# Patient Record
Sex: MALE | Race: WHITE | NOT HISPANIC OR LATINO | Employment: FULL TIME
[De-identification: names, ages, dates, MRNs, and addresses within clinical notes are randomized per-mention and may not be internally consistent; named-entity substitution may affect disease eponyms.]

---

## 2017-03-28 ENCOUNTER — RECORDS - HEALTHEAST (OUTPATIENT)
Dept: ADMINISTRATIVE | Facility: OTHER | Age: 52
End: 2017-03-28

## 2017-04-11 ENCOUNTER — OFFICE VISIT - HEALTHEAST (OUTPATIENT)
Dept: INTERNAL MEDICINE | Facility: CLINIC | Age: 52
End: 2017-04-11

## 2017-04-11 DIAGNOSIS — J11.1 INFLUENZA-LIKE SYNDROME: ICD-10-CM

## 2017-04-11 RX ORDER — HYDROCORTISONE VALERATE CREAM 2 MG/G
CREAM TOPICAL
Qty: 45 G | Refills: 1 | Status: SHIPPED | OUTPATIENT
Start: 2017-04-11

## 2017-05-23 ENCOUNTER — RECORDS - HEALTHEAST (OUTPATIENT)
Dept: ADMINISTRATIVE | Facility: OTHER | Age: 52
End: 2017-05-23

## 2017-06-27 ENCOUNTER — RECORDS - HEALTHEAST (OUTPATIENT)
Dept: ADMINISTRATIVE | Facility: OTHER | Age: 52
End: 2017-06-27

## 2017-07-12 ENCOUNTER — RECORDS - HEALTHEAST (OUTPATIENT)
Dept: ADMINISTRATIVE | Facility: OTHER | Age: 52
End: 2017-07-12

## 2017-07-12 ENCOUNTER — RECORDS - HEALTHEAST (OUTPATIENT)
Dept: BONE DENSITY | Facility: CLINIC | Age: 52
End: 2017-07-12

## 2017-07-12 DIAGNOSIS — K50.119 CROHN'S DISEASE OF LARGE INTESTINE WITH UNSPECIFIED COMPLICATIONS (H): ICD-10-CM

## 2017-07-17 ENCOUNTER — RECORDS - HEALTHEAST (OUTPATIENT)
Dept: ADMINISTRATIVE | Facility: OTHER | Age: 52
End: 2017-07-17

## 2017-07-19 ENCOUNTER — RECORDS - HEALTHEAST (OUTPATIENT)
Dept: ADMINISTRATIVE | Facility: OTHER | Age: 52
End: 2017-07-19

## 2017-07-23 ENCOUNTER — COMMUNICATION - HEALTHEAST (OUTPATIENT)
Dept: INTERNAL MEDICINE | Facility: CLINIC | Age: 52
End: 2017-07-23

## 2017-09-12 ENCOUNTER — RECORDS - HEALTHEAST (OUTPATIENT)
Dept: ADMINISTRATIVE | Facility: OTHER | Age: 52
End: 2017-09-12

## 2017-11-07 ENCOUNTER — RECORDS - HEALTHEAST (OUTPATIENT)
Dept: ADMINISTRATIVE | Facility: OTHER | Age: 52
End: 2017-11-07

## 2017-11-13 ENCOUNTER — RECORDS - HEALTHEAST (OUTPATIENT)
Dept: ADMINISTRATIVE | Facility: OTHER | Age: 52
End: 2017-11-13

## 2017-12-12 ENCOUNTER — RECORDS - HEALTHEAST (OUTPATIENT)
Dept: ADMINISTRATIVE | Facility: OTHER | Age: 52
End: 2017-12-12

## 2018-01-02 ENCOUNTER — RECORDS - HEALTHEAST (OUTPATIENT)
Dept: ADMINISTRATIVE | Facility: OTHER | Age: 53
End: 2018-01-02

## 2018-02-27 ENCOUNTER — RECORDS - HEALTHEAST (OUTPATIENT)
Dept: ADMINISTRATIVE | Facility: OTHER | Age: 53
End: 2018-02-27

## 2018-06-19 ENCOUNTER — RECORDS - HEALTHEAST (OUTPATIENT)
Dept: ADMINISTRATIVE | Facility: OTHER | Age: 53
End: 2018-06-19

## 2018-06-20 ENCOUNTER — RECORDS - HEALTHEAST (OUTPATIENT)
Dept: ADMINISTRATIVE | Facility: OTHER | Age: 53
End: 2018-06-20

## 2018-08-14 ENCOUNTER — RECORDS - HEALTHEAST (OUTPATIENT)
Dept: ADMINISTRATIVE | Facility: OTHER | Age: 53
End: 2018-08-14

## 2018-08-15 ENCOUNTER — RECORDS - HEALTHEAST (OUTPATIENT)
Dept: ADMINISTRATIVE | Facility: OTHER | Age: 53
End: 2018-08-15

## 2018-08-19 ENCOUNTER — COMMUNICATION - HEALTHEAST (OUTPATIENT)
Dept: INTERNAL MEDICINE | Facility: CLINIC | Age: 53
End: 2018-08-19

## 2018-08-21 ENCOUNTER — RECORDS - HEALTHEAST (OUTPATIENT)
Dept: ADMINISTRATIVE | Facility: OTHER | Age: 53
End: 2018-08-21

## 2018-08-22 ENCOUNTER — RECORDS - HEALTHEAST (OUTPATIENT)
Dept: ADMINISTRATIVE | Facility: OTHER | Age: 53
End: 2018-08-22

## 2018-10-09 ENCOUNTER — RECORDS - HEALTHEAST (OUTPATIENT)
Dept: ADMINISTRATIVE | Facility: OTHER | Age: 53
End: 2018-10-09

## 2018-11-21 ENCOUNTER — HOSPITAL ENCOUNTER (OUTPATIENT)
Dept: BEHAVIORAL HEALTH | Facility: CLINIC | Age: 53
Discharge: HOME | End: 2018-11-21
Attending: SOCIAL WORKER | Admitting: SOCIAL WORKER
Payer: COMMERCIAL

## 2018-11-21 PROCEDURE — H0001 ALCOHOL AND/OR DRUG ASSESS: HCPCS

## 2018-11-21 ASSESSMENT — PAIN SCALES - GENERAL: PAINLEVEL: NO PAIN (0)

## 2018-11-21 ASSESSMENT — ANXIETY QUESTIONNAIRES
6. BECOMING EASILY ANNOYED OR IRRITABLE: NOT AT ALL
2. NOT BEING ABLE TO STOP OR CONTROL WORRYING: SEVERAL DAYS
1. FEELING NERVOUS, ANXIOUS, OR ON EDGE: NOT AT ALL
GAD7 TOTAL SCORE: 1
3. WORRYING TOO MUCH ABOUT DIFFERENT THINGS: NOT AT ALL
5. BEING SO RESTLESS THAT IT IS HARD TO SIT STILL: NOT AT ALL
7. FEELING AFRAID AS IF SOMETHING AWFUL MIGHT HAPPEN: NOT AT ALL
4. TROUBLE RELAXING: NOT AT ALL

## 2018-11-21 ASSESSMENT — PATIENT HEALTH QUESTIONNAIRE - PHQ9: SUM OF ALL RESPONSES TO PHQ QUESTIONS 1-9: 5
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
## 2018-11-21 NOTE — PROGRESS NOTES
"Rule 25 Assessment  Background Information   1. Date of Assessment Request  2. Date of Assessment  11/21/2018 3. Date Service Authorized     4.   Evelia Lundy, SAMIRA, CONSTANZA, TIAGO     5.  Phone Number   [phone]   6. Referent  Family/Self 7. Assessment Site  [facility]     8. Client Name   Joe Pierce  9. Date of Birth  1965 Age  53 year old 10. Gender  male  11. PMI/ Insurance No.  [id number]   12. Client's Primary Language:  English 13. Do you require special accommodations, such as an  or assistance with written material? No   14. Current Address: [address] [place] [address]   15. Client Phone Numbers: [phone] (home)      16. Tell me what has happened to bring you here today.    On 11/21/2018, Mr. Diaz presented to the office of [facility] for a Rule 25 evaluation of substance use. He reported his family is concerned about his drinking and that he drinks too much. On 11/14/2018, they intervened with his mother being the ringleader and his mother got a friend and his children involved.     17. Have you had other rule 25 assessments?     No    DIMENSION I - Acute Intoxication /Withdrawal Potential   1. Chemical use most recent 12 months outside a facility and other significant use history (client self-report)              X = Primary Drug Used   Age of First Use Most Recent Pattern of Use and Duration   Need enough information to show pattern (both frequency and amounts) and to show tolerance for each chemical that has a diagnosis   Date of last use and time, if needed   Withdrawal Potential? Requiring special care Method of use  (oral, smoked, snort, IV, etc)     X Alcohol 16 Highest Use - 20s - 4 to 5 days per week on weekends. \"I didn't miss too many parties.\" He drank about 12 to 15 beers.     About 5 to 7 years ago - his marriage wasn't going great. Alcohol was a factor, but not the only reason. He drank 3 " days per week, consuming about 6 beers per time.     1.5 years ago - he . When his kids are around, he drinks 0 to 2 beers. When they aren't around, he drinks most days and has 6 to 10 craft or IPA beers. He will drink at the bar or at home. He's been involved in the [employer] and his coworkers enjoy trying places. He hasn't missed a beat in how the craft beers change.     11/17/18, 8 beers No Oral      Marijuana/  Hashish 17 Never been high.  High school No Smoke      Cocaine/Crack N/A           Meth/  Amphetamines N/A           Heroin   N/A           Other Opiates/  Synthetics N/A          Inhalants N/A           Benzodiazepines N/A           Hallucinogens N/A           Barbiturates/  Sedatives/  Hypnotics 40s For a period, the took OTC sleep meds, but stopped because he didn't like hangovers from it.   40s No Oral      Over-the-Counter Drugs N/A           Other N/A           Nicotine 16 Patient chews 2 tins per week.   11/21/18 No Oral     2. Do you use greater amounts of alcohol/other drugs to feel intoxicated or achieve the desired effect?  Yes.  Or use the same amount and get less of an effect?  Yes.  Example:     3A. Have you ever been to detox?  No    3B. When was the first time? NA    3C. How many times since then? NA    3D. Date of most recent detox: NA    4.  Withdrawal symptoms: Have you had any of the following withdrawal symptoms?  Past 12 months Recent (past 30 days)   Shaky / Jittery / Tremors  Headache  Irritability  Diarrhea Shaky / Jittery / Tremors  Headache  Irritability  Diarrhea     's Visual Observations and Symptoms: No visible withdrawal symptoms at this time    Based on the above information, is withdrawal likely to require attention as part of treatment participation?  No    Dimension I Ratings   Acute intoxication/Withdrawal potential - The placing authority must use the criteria in Dimension I to determine a client s acute intoxication and withdrawal potential.    RISK  DESCRIPTIONS - Severity ratin Client displays full functioning with good ability to tolerate and cope with withdrawal discomfort. No signs or symptoms of intoxication or withdrawal or resolving signs or symptoms.    REASONS SEVERITY WAS ASSIGNED (What about the amount of the person s use and date of most recent use and history of withdrawal problems suggests the potential of withdrawal symptoms requiring professional assistance? )     Patient does not appear at risk of having significant withdrawal symptoms at this time. He denied current feelings of withdrawal. He reports that his last use of alcohol was on 2018. Patient was administered a breathalyzer during the evaluation and the SUSAN was 0.00. Patient was also administered an urinalysis during the evaluation and the UA was negative for all substances. At the time of the Substance Use Evaluation his blood pressure was 124/96 and pulse was 87 BPM.      DIMENSION II - Biomedical Complications and Conditions   1. Do you have any current health/medical conditions?(Include any infectious diseases, allergies, or chronic or acute pain, history of chronic conditions)       Patient was diagnosed with Crohn's about 24 years ago. He had cataract surgery in the past. He denied allergies. He stated alcohol does not affect Crohn's and the Remicade has made Crohn's much better. Prednisone caused the cataracts.     2. Do you have a health care provider? When was your most recent appointment? What concerns were identified?     He has a primary care doctor and is due to go in for a check up.      3. If indicated by answers to items 1 or 2: How do you deal with these concerns? Is that working for you? If you are not receiving care for this problem, why not?      Remicade has been stabilizing the Crohn's and it's infused every 8 weeks.     4A. List current medication(s) including over-the-counter or herbal supplements--including pain management:     Remicade and Paroxetine.      4B. Do you follow current medical recommendations/take medications as prescribed?     Yes - he misses a dose every few weeks. He takes vitamins too.     4C. When did you last take your medication?     Today    4D. Do you need a referral to have a follow up with a primary care physician?    No.    5. Has a health care provider/healer ever recommended that you reduce or quit alcohol/drug use?     They ask questions and they recommended cutting back, but he didn't tell them the whole truth.     6. Are you pregnant?     NA - Male    7. Have you had any injuries, assaults/violence towards you, accidents, health related issues, overdose(s) or hospitalizations related to your use of alcohol or other drugs:     Yes, explain: has blackouts about once a month.     8. Do you have any specific physical needs/accommodations? No    Dimension II Ratings   Biomedical Conditions and Complications - The placing authority must use the criteria in Dimension II to determine a client s biomedical conditions and complications.   RISK DESCRIPTIONS - Severity ratin Client tolerates and travis with physical discomfort and is able to get the services that the client needs.    REASONS SEVERITY WAS ASSIGNED (What physical/medical problems does this person have that would inhibit his or her ability to participate in treatment? What issues does he or she have that require assistance to address?)    Patient was diagnosed with Crohn's about 24 years ago. He had cataract surgery in the past. He denied allergies. He is prescribed Remicade and Paroxetine. Patient denied having any chronic biomedical conditions that would interfere with treatment. He denied having pain. Patient has a primary care clinic and is able to seek services as needed and he would benefit from following all of the recommendations of medical providers.      DIMENSION III - Emotional, Behavioral, Cognitive Conditions and Complications   1. (Optional) Tell me what it was  "like growing up in your family. (substance use, mental health, discipline, abuse, support)     Patient grew up in [place]. His parents remained  until his father  in . Patient has an older brother and a younger brother. Patient denied emotional, physical, and sexual abuse. He denied mental health and substance use concerns in his family.     2. When was the last time that you had significant problems...  A. with feeling very trapped, lonely, sad, blue, depressed or hopeless about the future? 2 - 12 months ago - he has taken Paroxetine for the past 10 years. It doesn't eliminate the sad feelings, but might help his depression.     B. with sleep trouble, such as bad dreams, sleeping restlessly, or falling asleep during the day? Past Month - waking up frequently some nights due sleep apnea.     C. with feeling very anxious, nervous, tense, scared, panicked, or like something bad was going to happen? 2 - 12 months ago    D. with becoming very distressed and upset when something reminded you of the past? 1+ years ago    E. with thinking about ending your life or committing suicide? Never     3. When was the last time that you did the following things two or more times?  A. Lied or conned to get things you wanted or to avoid having to do something? 2 - 12 months ago    B. Had a hard time paying attention at school, work, or home? Past Month - for the past 5 years, he feels he has a blockage in his memory and some tasks seem daunting. He stated haven't been any medical conclusions as to the reason.     C. Had a hard time listening to instructions at school, work, or home? Past Month    D. Were a bully or threatened other people? 1+ years ago - in high school and he regrets it.     E. Started physical fights with other people? 1+ years ago - in college after drinking. Currently, when he drinks, he is \"mute and not aggressive.\"     Note: These questions are from the Global Appraisal of Individual " Needs--Short Screener. Any item marked  past month  or  2 to 12 months ago  will be scored with a severity rating of at least 2.     For each item that has occurred in the past month or past year ask follow up questions to determine how often the person has felt this way or has the behavior occurred? How recently? How has it affected their daily living? And, whether they were using or in withdrawal at the time?    See above    4A. If the person has answered item 2E with  in the past year  or  the past month , ask about frequency and history of suicide in the family or someone close and whether they were under the influence.     NA    Any history of suicide in your family? Or someone close to you?     No    4B. If the person answered item 2E  in the past month  ask about  intent, plan, means and access and any other follow-up information  to determine imminent risk. Document any actions taken to intervene  on any identified imminent risk.      NA    5A. Have you ever been diagnosed with a mental health problem?     He stated he has symptoms of depression and anxiety. He hasn't been to a psychiatrist.     5B. Are you receiving care for any mental health issues? If yes, what is the focus of that care or treatment?  Are you satisfied with the service? Most recent appointment?  How has it been helpful?     No    6. Have you been prescribed medications for emotional/psychological problems?     Paroxetine    7. Does your MH provider know about your use?     NA    8A. Have you ever been verbally, emotionally, physically or sexually abused?      No      Follow up questions to learn current risk, continuing emotional impact.      NA    8B. Have you received counseling for abuse?      No    9. Have you ever experienced or been part of a group that experienced community violence, historical trauma, rape or assault?     No    10A. [place]:    No    11. Do you have problems with any of the following things in your daily  life?    No    Note: If the person has any of the above problems, follow up with items 12, 13, and 14. If none of the issues in item 11 are a problem for the person, skip to item 15.    12. Have you been diagnosed with traumatic brain injury or Alzheimer s?  No    13. If the answer to #12 is no, ask the following questions:    Have you ever hit your head or been hit on the head? Yes, he played sports and probably hit his head. He denied change in personality and functioning.    Were you ever seen in the Emergency Room, hospital or by a doctor because of an injury to your head? No    Have you had any significant illness that affected your brain (brain tumor, meningitis, West Nile Virus, stroke or seizure, heart attack, near drowning or near suffocation)? No    14. If the answer to #12 is yes, ask if any of the problems identified in #11 occurred since the head injury or loss of oxygen. No    15A. Highest grade of school completed: College graduate    15B. Do you have a learning disability? No    15C. Did you ever have tutoring in Math or English? No    15D. Have you ever been diagnosed with Fetal Alcohol Effects or Fetal Alcohol Syndrome? No    16. If yes to item 15 B, C, or D: How has this affected your use or been affected by your use? NA    Dimension III Ratings   Emotional/Behavioral/Cognitive - The placing authority must use the criteria in Dimension III to determine a client s emotional, behavioral, and cognitive conditions and complications.   RISK DESCRIPTIONS - Severity ratin Client has impulse control and coping skills. Client presents a mild to moderate risk of harm to self or others or displays symptoms of emotional, behavioral or cognitive problems. Client has a mental health diagnosis and is stable. Client functions adequately in significant life areas.    REASONS SEVERITY WAS ASSIGNED - What current issues might with thinking, feelings or behavior pose barriers to participation in a treatment  program? What coping skills or other assets does the person have to offset those issues? Are these problems that can be initially accommodated by a treatment provider? If not, what specialized skills or attributes must a provider have?    Patient reported having symptoms of depression and anxiety. He is prescribed Paroxetine. Patient s PHQ-9 score was 5 out of 27, indicating mild depression. Patient s JENNI-7 score was 1 out of 21, indicating minimal anxiety. Patient denied suicidal and self-injurious ideation and intent at this time. Patient denied suicide attempts in the past. Patient denied a history of trauma and/or abuse.      DIMENSION IV - Readiness for Change   1. You ve told me what brought you here today. (first section) What do you think the problem really is?     His family intervened and want him to have an evaluation.     2. Tell me how things are going. Ask enough questions to determine whether the person has use related problems or assets that can be built upon in the following areas: Family/friends/relationships; Legal; Financial; Emotional; Educational; Recreational/ leisure; Vocational/employment; Living arrangements (DSM)      Patient and his wife have been  for 1.5 years. They live near each other and share kids the 50/50. In the spring, he found out about her infidelity and sought out counseling four times. He confronted his ex-wife. His drinking has picked up since then.     3. What activities have you engaged in when using alcohol/other drugs that could be hazardous to you or others (i.e. driving a car/motorcycle/boat, operating machinery, unsafe sex, sharing needles for drugs or tattoos, etc     Driving - DUI in 1994.     4. How much time do you spend getting, using or getting over using alcohol or drugs? (DSM)     He primarily drinks in the evenings around 6pm. He starts happy hours with coworkers at 4pm and has 3 beers. He then has more drinks at home. He passes out about once a  "week.    5. Reasons for drinking/drug use (Use the space below to record answers. It may not be necessary to ask each item.)  Like the feeling Yes   Trying to forget problems Yes   To cope with stress Yes   To relieve physical pain No   To cope with anxiety Yes   To cope with depression Yes   To relax or unwind Yes   Makes it easier to talk with people Starts off social and then he \"become the mute kris in the corner.\"    Partner encourages use N/A   Most friends drink or use 90%   To cope with family problems Yes   Afraid of withdrawal symptoms/to feel better No   Other (specify)  N/A     A. What concerns other people about your alcohol or drug use/Has anyone told you that you use too much? What did they say? (DSM)     His mother, brother, and friend Luis Miguel had an intervention with him last week. Inpatient is their vision, but that is not his plan. He stated it's been his mother's concern for a while. He hasn't been progressing towards drinking less. He stated his kids have seen some things too. They wrote him a letter saying they feel they have 2 dads. He has been intoxicated when coming home to his kids. His daughter complained that he wants to take naps alot. He stated that some of that is alcohol related, but the Crohn's contributes to it.    B. What did you think about that/ do you think you have a problem with alcohol or drug use?     Alcohol - he stated that about 50% of time he consumes too much.     6. What changes are you willing to make? What substance are you willing to stop using? How are you going to do that? Have you tried that before? What interfered with your success with that goal?      He is willing to stop drinking and go to outpatient treatment. He doesn't want inpatient treatment.    7. What would be helpful to you in making this change?     Different habits and structure.     Dimension IV Ratings   Readiness for Change - The placing authority must use the criteria in Dimension IV to determine a " "client s readiness for change.   RISK DESCRIPTIONS - Severity rating: 3 Client displays inconsistent compliance, minimal awareness of either the client s addiction or mental disorder, and is minimally cooperative.    REASONS SEVERITY WAS ASSIGNED - (What information did the person provide that supports your assessment of his or her readiness to change? How aware is the person of problems caused by continued use? How willing is she or he to make changes? What does the person feel would be helpful? What has the person been able to do without help?)      Patient identified that alcohol is a problem and he drinks too much about 50% of the time. At the time of the evaluation, patient stated he is willing to do outpatient treatment, but doesn't want inpatient. He appears internally motivated, but by the prompting of his family. Later when discussing treatment options, patient stated he is very busy at work and wants to start IOP in late 01/2019. He identified few coping strategies for reducing or stopping his drinking. He lacks insight in the extent of his drinking and into the process of recovery.     DIMENSION V - Relapse, Continued Use, and Continued Problem Potential   1. In what ways have you tried to control, cut-down or quit your use? If you have had periods of sobriety, how did you accomplish that? What was helpful? What happened to prevent you from continuing your sobriety? (DSM)     About 3 years ago, he didn't drink for a month. He stated he \"just didn't drink.\"     2. Have you experienced cravings? If yes, ask follow up questions to determine if the person recognizes triggers and if the person has had any success in dealing with them.     Cravings - \"Yeah.\"  Triggers - \"It's a habit I've had for a long time.\" Sporting events, neighbors, happy hours are triggers      3. Have you been treated for alcohol/other drug abuse/dependence?     No - after the DUI in 1994, he did the MADD Panel.     4. Support group " participation: Have you/do you attend support group meetings to reduce/stop your alcohol/drug use? How recently? What was your experience? Are you willing to restart? If the person has not participated, is he or she willing?     Not his kind of setting.     5. What would assist you in staying sober/straight?     Making a new habit.     Dimension V Ratings   Relapse/Continued Use/Continued problem potential - The placing authority must use the criteria in Dimension V to determine a client s relapse, continued use, and continued problem potential.   RISK DESCRIPTIONS - Severity rating: 3 Client has poor recognition and understanding of relapse and recidivism issues and displays moderately high vulnerability for further substance use or mental health problems. Client has few coping skills and rarely applies coping skills.    REASONS SEVERITY WAS ASSIGNED - (What information did the person provide that indicates his or her understanding of relapse issues? What about the person s experience indicates how prone he or she is to relapse? What coping skills does the person have that decrease relapse potential?)      Patient denied past treatments and support group attendance. He reported having minimal sober time. He has tried to quit drinking in the past but returned to use. Patient lacks knowledge of the addiction cycle. He lacks insight into his personal relapse process along with warning signs and triggers. He lacks sober coping skills and long-term sober maintenance skills. Patient is at a moderate to high risk for relapse/continued use. He has co-occurring mental health and substance use issues, which places him at higher risk for continued use.      DIMENSION VI - Recovery Environment   1. Are you employed/attending school? Tell me about that.     Patient has been an  at [employer] for 19 years. He had the same boss until a few months ago. He and the old boss were friends. It was a pretty lax environment and  "didn't matter much if he wasn't feeling the best the next morning. He does not drink at work because it is usually obvious when he is drinking. He stated if he has a long leash, then he will take it. But if he has a shorter leash, then he will moderate. He stated he will see how it goes with the new boss. He stated he will have to change how he drinks and he will have to show up. He stated he won't drink on Sundays or during week. And it's important for him to get quality sleep without passing out.     2A. Describe a typical day; evening for you. Work, school, social, leisure, volunteer, spiritual practices. Include time spent obtaining, using, recovering from drugs or alcohol. (DSM)     Wake up 7:30am, get ready, work 9am until 5pm, start drinking around 6pm until he falls asleep. He goes to happy hour once a week. Patient reported that use has prevented him from completing daily tasks at times.      2B. How often do you spend more time than you planned using or use more than you planned? (DSM)     20% of the time.     3. How important is using to your social connections? Do many of your family or friends use?     Most friends drink. \"They wouldn't divorce me\" if he didn't drink around them, but he worries about not fitting in socially. He golfs with people who drink. His friend Luis Miguel was a drinking justin for many years, but no longer drinks.     4A. Are you currently in a significant relationship?     No. Patient  in 1999 and  about 1.5 years ago.     4C. Sexual Orientation:     Heterosexual    5A. Who do you live with?      He lives alone but has his children 50% of the time.     5B. Tell me about their alcohol/drug use and mental health issues.     NA    5C. Are you concerned for your safety there? No    5D. Are you concerned about the safety of anyone else who lives with you? No    6A. Do you have children who live with you?     Daughters ages 17 and 15.      6B. Do you have children who do not " live with you?     NA    7A. Who supports you in making changes in your alcohol or drug use? What are they willing to do to support you? Who is upset or angry about you making changes in your alcohol or drug use? How big a problem is this for you?      Mother, brother, friend.     7B. This table is provided to record information about the person s relationships and available support It is not necessary to ask each item; only to get a comprehensive picture of their support system.  How often can you count on the following people when you need someone?   Partner / Spouse N/A   Parent(s)/Aunt(s)/Uncle(s)/Grandparents Always supportive   Sibling(s)/Cousin(s) Always supportive   Child(hyacinth) Usually supportive   Other relative(s) N/A   Friend(s)/neighbor(s) Always supportive   Child(hyacinth) s father(s)/mother(s) Always supportive   Support group member(s) N/A   Community of raquel members N/A   /counselor/therapist/healer N/A   Other (specify) NA     8A. What is your current living situation?     Patient lives alone in a house.     8B. What is your long term plan for where you will be living?     He has no plans to move.     8C. Tell me about your living environment/neighborhood? Ask enough follow up questions to determine safety, criminal activity, availability of alcohol and drugs, supportive or antagonistic to the person making changes.      Patient reported his neighborhood is safe and it is easy to get alcohol.     9. Criminal justice history: Gather current/recent history and any significant history related to substance use--Arrests? Convictions? Circumstances? Alcohol or drug involvement? Sentences? Still on probation or parole? Expectations of the court? Current court order? Any sex offenses - lifetime? What level? (DSM)    - DUI - 1994  - Public intoxication - age 21   - Patient denied current legal involvement.    10. What obstacles exist to participating in treatment? (Time off work, childcare, funding,  transportation, pending [facility] time, living situation)     Work schedule. He is transitioning in his job with and is busy until end of 2019.     Dimension VI Ratings   Recovery environment - The placing authority must use the criteria in Dimension VI to determine a client s recovery environment.   RISK DESCRIPTIONS - Severity ratin Client is engaged in structured, meaningful activity, but peers, family, significant other, and living environment are unsupportive, or there is criminal justice involvement by the client or among the client s peers, significant others, or in the client s living environment.    REASONS SEVERITY WAS ASSIGNED - (What support does the person have for making changes? What structure/stability does the person have in his or her daily life that will increase the likelihood that changes can be sustained? What problems exist in the person s environment that will jeopardize getting/staying clean and sober?)     Patient lives alone. His current living situation is unsupportive toward recovery, as he often drinks alone. He reported having relationship conflict with his family due to his ongoing substance use. He denied being in a significant relationship. Patient lacks an extensive current sober support network. He denied having any concerns regarding immediate living environment or neighborhood. Patient is employed full-time, but lacks a daily structure and meaningful activities after work. Patient denied legal involvement.      Client Choice/Exceptions   Would you like services specific to language, age, gender, culture, Confucianism preference, race, ethnicity, sexual orientation or disability?  No    What particular treatment choices and options would you like to have? Outpatient treatment.     Do you have a preference for a particular treatment program? [facility].     Criteria for Diagnosis     Criteria for Diagnosis  DSM-5 Criteria for Substance Use Disorder  Instructions: Determine whether  the client currently meets the criteria for Substance Use Disorder using the diagnostic criteria in the DSM-V [id number].[phone]. Current means during the most recent 12 months outside a facility that controls access to substances    Category of Substance Severity (ICD-10 Code / DSM 5 Code)     Alcohol Use Disorder Severe  (10.20) (303.90)   Cannabis Use Disorder NA   Hallucinogen Use Disorder NA   Inhalant Use Disorder NA   Opioid Use Disorder NA   Sedative, Hypnotic, or Anxiolytic Use Disorder NA   Stimulant Related Disorder NA   Tobacco Use Disorder NA   Other (or unknown) Substance Use Disorder NA     Collateral Contact Summary   Number of contacts made: 2  Contact with referring person:  NA.  If court related records were reviewed, summarize here: LILIAN    Rule 25 Assessment Summary and Plan   's Recommendation    It is recommended that patient:  1).Participate in and complete the [facility] intensive outpatient substance use treatment in [place], or a similar program.    2). Follow all subsequent recommendations of the substance use treatment providers.   3). Abstain from alcohol and all mood-altering substances, except as prescribed. Take all medications as prescribed.   4). Attend AA or other sober support groups at least once weekly and obtain a male sponsor for additional sober supports.   5). Become involved in a daily sober recreational activity/hobby of his own interest.  If patient is unable to maintain abstinence, or outpatient treatment does not meet his therapeutic needs, then it is recommended he start a higher level of care.    Collateral Contacts     Name:    Beverly Pierce   Relationship:    Mother   Phone Number:    [phone] Releases:    Yes     On 11/28/2018, Therapist called and spoke with Mrs. Pierce. She reported she is concerned about patient's drinking and health. He has been drinking for a long time and drinks too much, but has never missed work or had hangovers. She thinks an  outpatient treatment would help patient.     Collateral Contacts     Name:    Luis Miguel Chauhan   Relationship:    Friend   Phone Number:    [phone]   Releases:    Yes     On 11/28/2018, Therapist called and spoke with Mr. Chauhan. He stated patient drinks a lot of beer and too much. Patient is often the one who passes out at the bar or at a house. Their friendship has tapered off after Mr. Chauhan quit drinking, but he wants to very supportive to patient. He thinks patient needs inpatient treatment because he lives alone and it is very easy to drink at home.     Criteria for Diagnosis     A problematic pattern of alcohol/drug use leading to clinically significant impairment or distress, as manifested by at least two of the following, occurring within a 12-month period: 11/11    Alcohol/drug is often taken in larger amounts or over a longer period than was intended.  There is a persistent desire or unsuccessful efforts to cut down or control alcohol/drug use  A great deal of time is spent in activities necessary to obtain alcohol, use alcohol, or recover from its effects.  Craving, or a strong desire or urge to use alcohol/drug  Recurrent alcohol/drug use resulting in a failure to fulfill major role obligations at work, school or home.  Continued alcohol use despite having persistent or recurrent social or interpersonal problems caused or exacerbated by the effects of alcohol/drug.  Important social, occupational, or recreational activities are given up or reduced because of alcohol/drug use.  Recurrent alcohol/drug use in situations in which it is physically hazardous.  Alcohol/drug use is continued despite knowledge of having a persistent or recurrent physical or psychological problem that is likely to have been caused or exacerbated by alcohol.  Tolerance, as defined by either of the following: A need for markedly increased amounts of alcohol/drug to achieve intoxication or desired effect.  Withdrawal, as manifested by  either of the following: The characteristic withdrawal syndrome for alcohol/drug (refer to Criteria A and B of the criteria set for alcohol/drug withdrawal).    Specify if: In early remission:  After full criteria for alcohol/drug use disorder were previously met, none of the criteria for alcohol/drug use disorder have been met for at least 3 months but for less than 12 months (with the exception that Criterion A4,  Craving or a strong desire or urge to use alcohol/drug  may be met).     In sustained remission:   After full criteria for alcohol use disorder were previously met, none of the criteria for alcohol/drug use disorder have been met at any time during a period of 12 months or longer (with the exception that Criterion A4,  Craving or strong desire or urge to use alcohol/drug  may be met).   Specify if:   This additional specifier is used if the individual is in an environment where access to alcohol is restricted.    Mild: Presence of 2-3 symptoms  Moderate: Presence of 4-5 symptoms  Severe: Presence of 6 or more symptoms

## 2018-11-21 NOTE — PROGRESS NOTES
"ASSESSMENT SUMMARY    PATIENT NAME: Stan Diaz  MEDICAL RECORD NUMBER: [id number]  PATIENT ADDRESS: [address] [place] [address]  HOME TELEPHONE NUMBER: [phone] (home)   STATISTICS: YOB: 1965     Age: 53 year old     Gender: male    RELATIONSHIP STATUS:      DATE OF ASSESSMENT: 11/21/2018  EVALUATION COUNSELOR: Evelia Lundy    REFERRAL SOURCE: Family    REASON FOR EVALUATION:     On 11/21/2018, Mr. Diaz presented to the office of [facility] for a Rule 25 evaluation of substance use. He reported his family is concerned about his drinking and that he drinks too much. On 11/14/2018, they intervened with his mother being the ringleader and his mother got a friend and his children involved.     HEALTH HISTORY AND MEDICATIONS:     Patient was diagnosed with Crohn's about 24 years ago. He had cataract surgery in the past. He denied allergies. He is prescribed Remicade and Paroxetine. Patient denied having any chronic biomedical conditions that would interfere with treatment. He denied having pain. Patient has a primary care clinic and is able to seek services as needed and he would benefit from following all of the recommendations of medical providers.     HISTORY OF PREVIOUS TREATMENT AND COUNSELING:     None    HISTORY OF ALCOHOL AND DRUG USE:     Alcohol - Age 20s - 4 to 5 days per week on weekends. \"I didn't miss too many parties.\" He drank about 12 to 15 beers. About 5 to 7 years ago - his marriage wasn't going great. Alcohol was a factor, but not the only reason. He drank 3 days per week, consuming about 6 beers per time. About 1.5 years ago - he . When his kids are around, he drinks 0 to 2 beers. When they aren't around, he drinks most days and has 6 to 10 craft or IPA beers. He will drink at the bar or at home. He's been involved in the [employer] and his coworkers enjoy trying places. He hasn't missed a beat in how the craft beers change.  " LAST USE:     SUMMARY OF SUBSTANCE USE DISORDER SYMPTOMS ACKNOWLEDGED BY THE PATIENT: The patient identified positively with 11 of the 11 DSM-5 criteria for a primary diagnostic impression of substance use disorder severe    SUMMARY OF COLLATERAL DATA:    - On 11/28/2018, Therapist called and spoke with Mrs. Pierce. She reported she is concerned about patient's drinking and health. He has been drinking for a long time and drinks too much, but has never missed work or had hangovers. She thinks an outpatient treatment would help patient.   - On 11/28/2018, Therapist called and spoke with Mr. Chauhan. He stated patient drinks a lot of beer and too much. Patient is often the one who passes out at the bar or at a house. Their friendship has tapered off after Mr. Chauhan quit drinking, but he wants to very supportive to patient. He thinks patient needs inpatient treatment because he lives alone and it is very easy to drink at home.     IMPRESSION:    Alcohol Use Disorder Severe - 303.90 (F10.20)    [facility] PLACEMENT CRITERIA:    DIMENSION 1: Intoxication and Withdrawal:  The patient scored a 0.    Patient does not appear at risk of having significant withdrawal symptoms at this time. He denied current feelings of withdrawal. He reports that his last use of alcohol was on 11/17/2018. Patient was administered a breathalyzer during the evaluation and the SUSAN was 0.00. Patient was also administered an urinalysis during the evaluation and the UA was negative for all substances. At the time of the Substance Use Evaluation his blood pressure was 124/96 and pulse was 87 BPM.     DIMENSION 2: Biomedical Conditions:  The patient scored a 1.    Patient was diagnosed with Crohn's about 24 years ago. He had cataract surgery in the past. He denied allergies. He is prescribed Remicade and Paroxetine. Patient denied having any chronic biomedical conditions that would interfere with treatment. He denied having pain. Patient has a primary care  clinic and is able to seek services as needed and he would benefit from following all of the recommendations of medical providers.     DIMENSION 3: Emotional and Behavioral:  The patient scored a 1.    Patient reported having symptoms of depression and anxiety. He is prescribed Paroxetine. Patient s PHQ-9 score was 5 out of 27, indicating mild depression. Patient s JENNI-7 score was 1 out of 21, indicating minimal anxiety. Patient denied suicidal and self-injurious ideation and intent at this time. Patient denied suicide attempts in the past. Patient denied a history of trauma and/or abuse.     DIMENSION 4: Readiness to Change:  The patient scored a 3.    Patient identified that alcohol is a problem and he drinks too much about 50% of the time. At the time of the evaluation, patient stated he is willing to do outpatient treatment, but doesn't want inpatient. He appeared internally motivated, but by the prompting of his family. Later when discussing treatment options, patient stated he is very busy at work and wants to start IOP in late 01/2019. He identified few coping strategies for reducing or stopping his drinking. He lacks insight in the extent of his drinking and into the process of recovery.    DIMENSION 5: Relapse Potential:  The patient scored a 3.    Patient denied past treatments and support group attendance. He reported having minimal sober time. He has tried to quit drinking in the past but returned to use. Patient lacks knowledge of the addiction cycle. He lacks insight into his personal relapse process along with warning signs and triggers. He lacks sober coping skills and long-term sober maintenance skills. Patient is at a moderate to high risk for relapse/continued use. He has co-occurring mental health and substance use issues, which places him at higher risk for continued use.     DIMENSION 6: Recovery Environment:  The patient scored a 2.    Patient lives alone. His current living situation is  unsupportive toward recovery, as he often drinks alone. He reported having relationship conflict with his family due to his ongoing substance use. He denied being in a significant relationship. Patient lacks an extensive current sober support network. He denied having any concerns regarding immediate living environment or neighborhood. Patient is employed full-time, but lacks a daily structure and meaningful activities after work. Patient denied legal involvement.    RECOMMENDATIONS:    It is recommended that patient:  1).Participate in and complete the [facility] intensive outpatient substance use treatment in [place], or a similar program.    2). Follow all subsequent recommendations of the substance use treatment providers.   3). Abstain from alcohol and all mood-altering substances, except as prescribed. Take all medications as prescribed.   4). Attend AA or other sober support groups at least once weekly and obtain a male sponsor for additional sober supports.   5). Become involved in a daily sober recreational activity/hobby of his own interest.  If patient is unable to maintain abstinence, or outpatient treatment does not meet his therapeutic needs, then it is recommended he start a higher level of care.    INITIAL PROBLEM LIST:    The patient lacks relapse prevention skills  The patient has poor coping skills  The patient lacks a sober peer support network      This information has been disclosed to you from records protected by Federal confidentiality rules (42 CFR part 2). The Federal rules prohibit you from making any further disclosure of this information unless further disclosure is expressly permitted by the written consent of the person to whom it pertains or as otherwise permitted by 42 CFR part 2. A general authorization for the release of medical or other information is NOT sufficient for this purpose. The Federal rules restrict any use of the information to criminally investigate or prosecute  any alcohol or drug abuse patient.

## 2018-11-22 ASSESSMENT — ANXIETY QUESTIONNAIRES: GAD7 TOTAL SCORE: 1

## 2018-11-28 VITALS
DIASTOLIC BLOOD PRESSURE: 96 MMHG | SYSTOLIC BLOOD PRESSURE: 124 MMHG | HEIGHT: 68 IN | WEIGHT: 192.6 LBS | BODY MASS INDEX: 29.19 KG/M2 | HEART RATE: 87 BPM

## 2018-12-01 ENCOUNTER — COMMUNICATION - HEALTHEAST (OUTPATIENT)
Dept: INTERNAL MEDICINE | Facility: CLINIC | Age: 53
End: 2018-12-01

## 2018-12-17 ENCOUNTER — OFFICE VISIT - HEALTHEAST (OUTPATIENT)
Dept: INTERNAL MEDICINE | Facility: CLINIC | Age: 53
End: 2018-12-17

## 2018-12-17 DIAGNOSIS — B02.9 HERPES ZOSTER WITHOUT COMPLICATION: ICD-10-CM

## 2018-12-17 ASSESSMENT — MIFFLIN-ST. JEOR: SCORE: 1703.17

## 2019-01-31 ENCOUNTER — RECORDS - HEALTHEAST (OUTPATIENT)
Dept: ADMINISTRATIVE | Facility: OTHER | Age: 54
End: 2019-01-31

## 2019-03-26 ENCOUNTER — RECORDS - HEALTHEAST (OUTPATIENT)
Dept: ADMINISTRATIVE | Facility: OTHER | Age: 54
End: 2019-03-26

## 2019-04-03 ENCOUNTER — RECORDS - HEALTHEAST (OUTPATIENT)
Dept: ADMINISTRATIVE | Facility: OTHER | Age: 54
End: 2019-04-03

## 2019-06-18 ENCOUNTER — COMMUNICATION - HEALTHEAST (OUTPATIENT)
Dept: SCHEDULING | Facility: CLINIC | Age: 54
End: 2019-06-18

## 2019-06-21 ENCOUNTER — COMMUNICATION - HEALTHEAST (OUTPATIENT)
Dept: INTERNAL MEDICINE | Facility: CLINIC | Age: 54
End: 2019-06-21

## 2019-06-21 DIAGNOSIS — F41.9 ANXIETY: ICD-10-CM

## 2019-07-09 ENCOUNTER — OFFICE VISIT - HEALTHEAST (OUTPATIENT)
Dept: INTERNAL MEDICINE | Facility: CLINIC | Age: 54
End: 2019-07-09

## 2019-07-09 DIAGNOSIS — K50.819 CROHN'S DISEASE OF SMALL AND LARGE INTESTINES WITH COMPLICATION (H): ICD-10-CM

## 2019-07-09 DIAGNOSIS — F41.9 ANXIETY: ICD-10-CM

## 2020-07-12 ENCOUNTER — COMMUNICATION - HEALTHEAST (OUTPATIENT)
Dept: SCHEDULING | Facility: CLINIC | Age: 55
End: 2020-07-12

## 2020-07-15 ENCOUNTER — COMMUNICATION - HEALTHEAST (OUTPATIENT)
Dept: INTERNAL MEDICINE | Facility: CLINIC | Age: 55
End: 2020-07-15

## 2020-07-17 ENCOUNTER — COMMUNICATION - HEALTHEAST (OUTPATIENT)
Dept: INTERNAL MEDICINE | Facility: CLINIC | Age: 55
End: 2020-07-17

## 2020-07-17 DIAGNOSIS — F41.9 ANXIETY: ICD-10-CM

## 2020-07-20 ENCOUNTER — OFFICE VISIT - HEALTHEAST (OUTPATIENT)
Dept: INTERNAL MEDICINE | Facility: CLINIC | Age: 55
End: 2020-07-20

## 2020-07-20 DIAGNOSIS — F41.9 ANXIETY: ICD-10-CM

## 2020-07-20 RX ORDER — PAROXETINE 40 MG/1
40 TABLET, FILM COATED ORAL DAILY
Qty: 90 TABLET | Refills: 3 | Status: SHIPPED | OUTPATIENT
Start: 2020-07-20 | End: 2021-11-11

## 2020-07-20 ASSESSMENT — PATIENT HEALTH QUESTIONNAIRE - PHQ9: SUM OF ALL RESPONSES TO PHQ QUESTIONS 1-9: 0

## 2020-08-28 ENCOUNTER — COMMUNICATION - HEALTHEAST (OUTPATIENT)
Dept: SCHEDULING | Facility: CLINIC | Age: 55
End: 2020-08-28

## 2020-08-29 ENCOUNTER — OFFICE VISIT - HEALTHEAST (OUTPATIENT)
Dept: FAMILY MEDICINE | Facility: CLINIC | Age: 55
End: 2020-08-29

## 2020-08-29 DIAGNOSIS — H61.23 BILATERAL IMPACTED CERUMEN: ICD-10-CM

## 2020-08-29 DIAGNOSIS — M94.8X9 PERICHONDRITIS: ICD-10-CM

## 2020-08-31 ENCOUNTER — OFFICE VISIT - HEALTHEAST (OUTPATIENT)
Dept: INTERNAL MEDICINE | Facility: CLINIC | Age: 55
End: 2020-08-31

## 2020-08-31 DIAGNOSIS — L03.90 CELLULITIS, UNSPECIFIED CELLULITIS SITE: ICD-10-CM

## 2020-08-31 RX ORDER — ACETIC ACID 20.65 MG/ML
SOLUTION AURICULAR (OTIC)
Qty: 15 ML | Refills: 5 | Status: SHIPPED | OUTPATIENT
Start: 2020-08-31 | End: 2021-12-24

## 2020-08-31 ASSESSMENT — MIFFLIN-ST. JEOR: SCORE: 1696.08

## 2020-11-25 ENCOUNTER — RECORDS - HEALTHEAST (OUTPATIENT)
Dept: ADMINISTRATIVE | Facility: OTHER | Age: 55
End: 2020-11-25

## 2020-11-25 LAB
ALT SERPL W/O P-5'-P-CCNC: 60 U/L (ref 0–78)
AST SERPL-CCNC: 43 U/L (ref 0–37)

## 2020-11-28 ENCOUNTER — RECORDS - HEALTHEAST (OUTPATIENT)
Dept: ADMINISTRATIVE | Facility: OTHER | Age: 55
End: 2020-11-28

## 2020-12-02 ENCOUNTER — RECORDS - HEALTHEAST (OUTPATIENT)
Dept: HEALTH INFORMATION MANAGEMENT | Facility: CLINIC | Age: 55
End: 2020-12-02

## 2021-01-19 ENCOUNTER — RECORDS - HEALTHEAST (OUTPATIENT)
Dept: ADMINISTRATIVE | Facility: OTHER | Age: 56
End: 2021-01-19

## 2021-01-20 ENCOUNTER — RECORDS - HEALTHEAST (OUTPATIENT)
Dept: ADMINISTRATIVE | Facility: OTHER | Age: 56
End: 2021-01-20

## 2021-02-11 ENCOUNTER — RECORDS - HEALTHEAST (OUTPATIENT)
Dept: HEALTH INFORMATION MANAGEMENT | Facility: CLINIC | Age: 56
End: 2021-02-11

## 2021-03-17 ENCOUNTER — RECORDS - HEALTHEAST (OUTPATIENT)
Dept: ADMINISTRATIVE | Facility: OTHER | Age: 56
End: 2021-03-17

## 2021-05-12 ENCOUNTER — RECORDS - HEALTHEAST (OUTPATIENT)
Dept: ADMINISTRATIVE | Facility: OTHER | Age: 56
End: 2021-05-12

## 2021-05-12 LAB
ALT SERPL W/O P-5'-P-CCNC: 42 U/L (ref 0–78)
AST SERPL-CCNC: 27 U/L (ref 0–37)

## 2021-05-17 ENCOUNTER — RECORDS - HEALTHEAST (OUTPATIENT)
Dept: ADMINISTRATIVE | Facility: OTHER | Age: 56
End: 2021-05-17

## 2021-05-18 ENCOUNTER — RECORDS - HEALTHEAST (OUTPATIENT)
Dept: HEALTH INFORMATION MANAGEMENT | Facility: CLINIC | Age: 56
End: 2021-05-18

## 2021-05-24 ENCOUNTER — OFFICE VISIT - HEALTHEAST (OUTPATIENT)
Dept: FAMILY MEDICINE | Facility: CLINIC | Age: 56
End: 2021-05-24

## 2021-05-24 DIAGNOSIS — R03.0 ELEVATED BLOOD PRESSURE READING WITHOUT DIAGNOSIS OF HYPERTENSION: ICD-10-CM

## 2021-05-24 DIAGNOSIS — H61.21 IMPACTED CERUMEN OF RIGHT EAR: ICD-10-CM

## 2021-05-27 ENCOUNTER — RECORDS - HEALTHEAST (OUTPATIENT)
Dept: ADMINISTRATIVE | Facility: CLINIC | Age: 56
End: 2021-05-27

## 2021-05-27 ASSESSMENT — PATIENT HEALTH QUESTIONNAIRE - PHQ9: SUM OF ALL RESPONSES TO PHQ QUESTIONS 1-9: 0

## 2021-05-28 ENCOUNTER — RECORDS - HEALTHEAST (OUTPATIENT)
Dept: ADMINISTRATIVE | Facility: CLINIC | Age: 56
End: 2021-05-28

## 2021-05-29 ENCOUNTER — RECORDS - HEALTHEAST (OUTPATIENT)
Dept: ADMINISTRATIVE | Facility: CLINIC | Age: 56
End: 2021-05-29

## 2021-05-29 NOTE — TELEPHONE ENCOUNTER
Former patient of Samara Gibbs & has not established care with another provider.  Please assign refill request to covering provider per Clinic standard process.  Header states no PCP.  Dr Gibbs last saw him 4/11/2017.  Last OV was 12/17/2018 with Dr. Ilir Dietz was last to order the Paxil on 12/3/2018  Thank you for your help.  Mariah Brandt RN, BAN, Nurse Advisor, [place] 11p-7a

## 2021-05-29 NOTE — TELEPHONE ENCOUNTER
Over past few days cramp in calf.  Little swollen right leg.    No previos blood clots or use of blood thinners.    Pain is worse when walking.    His doctor friend evaluated it this morning and said he needs ultrasound.    Needs to go through ED to get ultrasound ordered.    No pcp.    Saskia Trevino, RN, [facility] Nurse Triage/Med Refills RN         Reason for Disposition    Thigh, calf, or ankle swelling in only one leg    Protocols used: LEG PAIN-A-OH

## 2021-05-30 VITALS — BODY MASS INDEX: 31.6 KG/M2 | WEIGHT: 195.8 LBS

## 2021-05-30 NOTE — PROGRESS NOTES
ASSESSMENT AND PLAN:    1. Anxiety  He is happy with his control of symptoms. Refilled his medications  - PARoxetine (PAXIL) 40 MG tablet; Take 1 tablet (40 mg total) by mouth daily.  Dispense: 90 tablet; Refill: 3    2. Crohn's disease of small and large intestines with complication   Has had very good response to remicade, and has GI follow up.     Patient Instructions   1. Refilled paroxetene 40 mg daily    2. Follow up once yearly.     CHIEF COMPLAINT:  Chief Complaint   Patient presents with     Medication Refill     med check and possible refills      HISTORY OF PRESENT ILLNESS:  Joe Pierce is a 54 y.o. male with follow for medication evaluation.  His anxiety is controlled satisfactorily on the current treatments.  No unusual dyspnea, or chest pain, or nausea or changes in bowel or bladder symptoms.  He has had good response to remicade for his crohn's disease.     REVIEW OF SYSTEMS:   See HPI, all other systems on review are negative.    Past Medical History:   Diagnosis Date     Anxiety disorder      Crohn's disease (H)      Immunosuppression (H)      Seasonal allergies      Social History     Tobacco Use   Smoking Status Never Smoker   Smokeless Tobacco Former User     Types: Chew     Family History   Problem Relation Age of Onset     Ovarian cancer Mother      Heart attack Father      Past Surgical History:   Procedure Laterality Date     APPENDECTOMY       VITALS:  Vitals:    07/09/19 0937   BP: 128/76   Pulse: 72   Resp: 10   SpO2: 96%   Weight: 197 lb 4.8 oz (89.5 kg)     Wt Readings from Last 3 Encounters:   07/09/19 197 lb 4.8 oz (89.5 kg)   06/18/19 195 lb (88.5 kg)   12/17/18 198 lb 5 oz (90 kg)     PHYSICAL EXAM:  Constitutional:  In NAD, alert and oriented  Neck: no significant cervical or axillary adenopathy  Cardiac:  S1 S2 without murmur  Lungs: Clear to auscultation  Psychiatric:  Mood and behavior appropriate, thinking is clear.     DECISION TO OBTAIN OLD RECORDS AND/OR OBTAIN HISTORY  FROM SOMEONE OTHER THAN PATIENT, AND/OR ACCESSING CARE EVERYWHERE):  1  0    REVIEW AND SUMMARIZATION OF OLD RECORDS, AND/OR OBTAINING HISTORY FROM SOMEONE OTHER THAN PATIENT, AND/OR DISCUSSION OF CASE WITH ANOTHER HEALTH CARE PROVIDER:  2 reviewed recent ER visit for leg pain    REVIEW AND/OR ORDER OF OF CLINICAL LAB TESTS: 1  Reviewed recent lab testing.    REVIEW AND/OR ORDER OF RADIOLOGY TESTS: 1 reviewed recent leg US.    REVIEW AND/OR ORDER OF MEDICAL TESTS (EKG/ECHO/COLONOSCOPY/EGD): 1  0    INDEPENDENT  VISUALIZATION OF IMAGE, TRACING, OR SPECIMEN ITSELF (2 EACH):  0     TOTAL: 4    Current Outpatient Medications   Medication Sig Dispense Refill     inFLIXimab (REMICADE) 100 mg injection Infuse into a venous catheter. Every 8 weeks       PARoxetine (PAXIL) 40 MG tablet Take 1 tablet (40 mg total) by mouth daily. 90 tablet 3     fexofenadine (ALLEGRA) 180 MG tablet Take 180 mg by mouth as needed.       hydrocortisone (WESTCORT) 0.2 % cream Apply to affected area bid 45 g 1     No current facility-administered medications for this visit.      Bahman Porter MD  Internal Medicine  [facility]

## 2021-06-01 ENCOUNTER — RECORDS - HEALTHEAST (OUTPATIENT)
Dept: ADMINISTRATIVE | Facility: CLINIC | Age: 56
End: 2021-06-01

## 2021-06-02 ENCOUNTER — RECORDS - HEALTHEAST (OUTPATIENT)
Dept: ADMINISTRATIVE | Facility: CLINIC | Age: 56
End: 2021-06-02

## 2021-06-02 VITALS — HEIGHT: 67 IN | BODY MASS INDEX: 31.12 KG/M2 | WEIGHT: 198.31 LBS

## 2021-06-03 VITALS — BODY MASS INDEX: 30.9 KG/M2 | WEIGHT: 197.3 LBS

## 2021-06-04 VITALS
SYSTOLIC BLOOD PRESSURE: 162 MMHG | OXYGEN SATURATION: 99 % | HEART RATE: 90 BPM | BODY MASS INDEX: 30.23 KG/M2 | TEMPERATURE: 98 F | DIASTOLIC BLOOD PRESSURE: 107 MMHG | WEIGHT: 193 LBS | RESPIRATION RATE: 18 BRPM

## 2021-06-04 VITALS
HEIGHT: 67 IN | WEIGHT: 196.75 LBS | DIASTOLIC BLOOD PRESSURE: 86 MMHG | BODY MASS INDEX: 30.88 KG/M2 | OXYGEN SATURATION: 98 % | SYSTOLIC BLOOD PRESSURE: 128 MMHG | HEART RATE: 88 BPM | RESPIRATION RATE: 16 BRPM

## 2021-06-09 NOTE — TELEPHONE ENCOUNTER
RN Triage:     Patient calling in stating he was exposed to someone with COVID19 positive person he hugged them he stated. Last Monday he was exposed and now he has no symptoms. He has remicade medication for his crohns disease.  Advised self isolate and [employer]        COVID 19 Nurse Triage Plan/Patient Instructions    Please be aware that novel coronavirus (COVID-19) may be circulating in the community. If you develop symptoms such as fever, cough, or SOB or if you have concerns about the presence of another infection including coronavirus (COVID-19), please contact your health care provider or visit [email or web address].     Disposition/Instructions    Virtual Visit with provider recommended. Reference Visit Selection Guide.    Thank you for taking steps to prevent the spread of this virus.  o Limit your contact with others.  o Wear a simple mask to cover your cough.  o Wash your hands well and often.    Resources    [facility]: About COVID-19: [email or web address]    CDC: What to Do If You're Sick: www.cdc.gov/coronavirus/2019-ncov/about/steps-when-sick.html    CDC: Ending Home Isolation: www.cdc.gov/coronavirus/2019-ncov/hcp/disposition-in-home-patients.html     CDC: Caring for Someone: www.cdc.gov/coronavirus/2019-ncov/if-you-are-sick/care-for-someone.html     [facility]: Interim Guidance for Hospital Discharge to Home: www.health.[facility].mn.us/diseases/coronavirus/hcp/hospdischarge.pdf    [facility] clinical trials (COVID-19 research studies): clinicalaffairs.[facility]/umn-clinical-trials     Below are the COVID-19 hotlines at the [facility] of [facility] ([facility]). Interpreters are available.   o For health questions: Call 254-976-5227 or 1-203.291.5189 (7 a.m. to 7 p.m.)  o For questions about schools and childcare: Call 777-456-3818 or 1-660.661.3176 (7 a.m. to 7 p.m.)     COVID 19 Nurse Triage Plan/Patient Instructions    Please be aware that novel coronavirus (COVID-19) may be  circulating in the community. If you develop symptoms such as fever, cough, or SOB or if you have concerns about the presence of another infection including coronavirus (COVID-19), please contact your health care provider or visit [email or web address].     Disposition/Instructions    Additional COVID19 information to add for patients.   How can I protect others?  If you have symptoms (fever, cough, body aches or trouble breathing): Stay home and away from others (self-isolate) until:    At least 10 days have passed since your symptoms started. And     You ve had no fever--and no medicine that reduces fever--for 3 full days (72 hours). And      Your other symptoms have resolved (gotten better).     If you don t have symptoms, but a test showed that you have COVID-19 (you tested positive):    Stay home and away from others (self-isolate) until at least 10 days have passed since the date of your first positive COVID-19 test.    During this time:    Stay in your own room, even for meals. Use your own bathroom if you can.     Stay away from others in your home. No hugging, kissing or shaking hands. No visitors.    Don t go to work, school or anywhere else.     Clean  high touch  surfaces often (doorknobs, counters, handles, etc.). Use a household cleaning spray or wipes. You ll find a full list on the EPA website:  www.epa.gov/pesticide-registration/list-n-disinfectants-use-against-sars-cov-2.    Cover your mouth and nose with a mask, tissue or washcloth to avoid spreading germs.    Wash your hands and face often. Use soap and water.    Caregivers in these groups are at risk for severe illness due to COVID-19:  o People 65 years and older  o People who live in a nursing home or long-term care facility  o People with chronic disease (lung, heart, cancer, diabetes, kidney, liver, immunologic)  o People who have a weakened immune system, including those who:  - Are in cancer treatment  - Take medicine that weakens the immune  system, such as corticosteroids  - Had a bone marrow or organ transplant  - Have an immune deficiency  - Have poorly controlled HIV or AIDS  - Are obese (body mass index of 40 or higher)  - Smoke regularly    Caregivers should wear gloves while washing dishes, handling laundry and cleaning bedrooms and bathrooms.    Use caution when washing and drying laundry: Don t shake dirty laundry, and use the warmest water setting that you can.    For more tips, go to www.cdc.gov/coronavirus/2019-ncov/downloads/10Things.pdf.    How can I take care of myself?  1. Get lots of rest. Drink extra fluids (unless a doctor has told you not to).     2. Take Tylenol (acetaminophen) for fever or pain. If you have liver or kidney problems, ask your family doctor if it s okay to take Tylenol.     Adults can take either:     650 mg (two 325 mg pills) every 4 to 6 hours, or     1,000 mg (two 500 mg pills) every 8 hours as needed.     Note: Don t take more than 3,000 mg in one day.   Acetaminophen is found in many medicines (both prescribed and over-the-counter medicines). Read all labels to be sure you don t take too much.     For children, check the Tylenol bottle for the right dose. The dose is based on the child s age or weight.    3. If you have other health problems (like cancer, heart failure, an organ transplant or severe kidney disease): Call your specialty clinic if you don t feel better in the next 2 days.    4. Know when to call 911: Emergency warning signs include:    Trouble breathing or shortness of breath    Pain or pressure in the chest that doesn t go away    Feeling confused like you haven t felt before, or not being able to wake up    Bluish-colored lips or face    What are the symptoms of COVID-19?     The most common symptoms are cough, fever and trouble breathing.     Less common symptoms include body aches, chills, diarrhea (loose, watery poops), fatigue (feeling very tired), headache, runny nose, sore throat and loss  of smell.    COVID-19 can cause severe coughing (bronchitis) and lung infection (pneumonia).    How does it spread?     The virus may spread when a person coughs or sneezes into the air. The virus can travel about 6 feet this way, and it can live on surfaces.      Common  (household disinfectants) will kill the virus.    Who is at risk?  Anyone can catch COVID-19 if they re around someone who has the virus.    How can others protect themselves?     Stay away from people who have COVID-19 (or symptoms of COVID-19).    Wash hands often with soap and water. Or, use hand  with at least 60% alcohol.    Avoid touching the eyes, nose or mouth.     Wear a face mask when you go out in public, when sick or when caring for a sick person.    Where can I get more information?    [facility]: About COVID-19: [email or web address]    CDC: What to Do If You re Sick: www.cdc.gov/coronavirus/2019-ncov/about/steps-when-sick.html    CDC: Ending Home Isolation: www.cdc.gov/coronavirus/2019-ncov/hcp/disposition-in-home-patients.html     CDC: Caring for Someone: www.cdc.gov/coronavirus/2019-ncov/if-you-are-sick/care-for-someone.html    [facility]: Interim Guidance for Hospital Discharge to Home: www.health.[facility].mn./diseases/coronavirus/hcp/hospdischarge.pdf    [facility] clinical trials (COVID-19 research studies): clinicalaffairs.[facility]-clinical-trials     Below are the COVID-19 hotlines at the [place] [facility] of [facility] ([facility]). Interpreters are available.   o For health questions: Call 769-161-2493 or 1-444.620.2545 (7 a.m. to 7 p.m.)  o For questions about schools and childcare: Call 243-783-9048 or 1-437.477.4205 (7 a.m. to 7 p.m.)            Thank you for taking steps to prevent the spread of this virus.  o Limit your contact with others.  o Wear a simple mask to cover your cough.  o Wash your hands well and often.    Resources    [facility]: About COVID-19:  [email or web address]    CDC: What to Do If You're Sick: www.cdc.gov/coronavirus/2019-ncov/about/steps-when-sick.html    CDC: Ending Home Isolation: www.cdc.gov/coronavirus/2019-ncov/hcp/disposition-in-home-patients.html     CDC: Caring for Someone: www.cdc.gov/coronavirus/2019-ncov/if-you-are-sick/care-for-someone.html     [facility]: Interim Guidance for Hospital Discharge to Home: www.health.[facility].mn./diseases/coronavirus/hcp/hospdischarge.pdf    [facility] clinical trials (COVID-19 research studies): clinicalaffairs.[facility]-clinical-trials     Below are the COVID-19 hotlines at the [place] [facility] ([facility]). Interpreters are available.   o For health questions: Call 942-540-1929 or 1-219.970.1996 (7 a.m. to 7 p.m.)  o For questions about schools and childcare: Call 623-473-5391 or 1-995.171.6392 (7 a.m. to 7 p.m.)         Reason for Disposition    [1] COVID-19 EXPOSURE (Close Contact) within last 14 days AND [2] needs COVID-19 lab test to return to work AND [3] NO symptoms    Additional Information    Negative: COVID-19 has been diagnosed by a healthcare provider (HCP)    Negative: COVID-19 lab test positive    Negative: [1] Symptoms of COVID-19 (e.g., cough, fever, SOB, or others) AND [2] lives in an area with community spread    Negative: [1] Symptoms of COVID-19 (e.g., cough, fever, SOB, or others) AND [2] within 14 days of EXPOSURE (close contact) with diagnosed or suspected COVID-19 patient    Negative: [1] Symptoms of COVID-19 (e.g., cough, fever, SOB, or others) AND [2] within 14 days of travel from high-risk area for COVID-19 community spread (identified by CDC)    Negative: [1] Difficulty breathing (shortness of breath) occurs AND [2] onset > 14 days after COVID-19 EXPOSURE (Close Contact) AND [3] no community spread where patient lives    Negative: [1] Dry cough occurs AND [2] onset > 14 days after COVID-19 EXPOSURE AND [3] no community spread where patient lives     Negative: [1] Wet cough (i.e., white-yellow, yellow, green, or manoj colored sputum) AND [2] onset > 14 days after COVID-19 EXPOSURE AND [3] no community spread where patient lives    Negative: [1] Common cold symptoms AND [2] onset > 14 days after COVID-19 EXPOSURE AND [3] no community spread where patient lives    Protocols used: CORONAVIRUS (COVID-19) EXPOSURE-A- 5.16.20

## 2021-06-09 NOTE — PROGRESS NOTES
"Joe Pierce is a 55 y.o. male who is being evaluated via a billable telephone visit.      The patient has been notified of following:     \"This telephone visit will be conducted via a call between you and your physician/provider. We have found that certain health care needs can be provided without the need for a physical exam.  This service lets us provide the care you need with a short phone conversation.  If a prescription is necessary we can send it directly to your pharmacy.  If lab work is needed we can place an order for that and you can then stop by our lab to have the test done at a later time.    Telephone visits are billed at different rates depending on your insurance coverage. During this emergency period, for some insurers they may be billed the same as an in-person visit.  Please reach out to your insurance provider with any questions.    If during the course of the call the physician/provider feels a telephone visit is not appropriate, you will not be charged for this service.\"    Patient has given verbal consent to a Telephone visit? Yes    What phone number would you like to be contacted at? [phone]    Patient would like to receive their AVS by AVS Preference: [employer].    Additional provider notes: He is feeling well.  No crohn's symptoms on remicade. No dyspnea, or cough or fever.  Tolerating activity without trouble.  Has follow up soon with GI.     Assessment/Plan:    1. Anxiety   Paroxetine is refilled.     2. Crohn's disease  Remains in remission and has ongoing remicade therapy with GI follow up.  Due for colonoscopy soon.     3. Preventive health  Due for PSA later this year.     Phone call duration:  9 minutes    Eladia Galvez CMA  "

## 2021-06-10 ENCOUNTER — OFFICE VISIT - HEALTHEAST (OUTPATIENT)
Dept: OTOLARYNGOLOGY | Facility: CLINIC | Age: 56
End: 2021-06-10

## 2021-06-10 DIAGNOSIS — L29.9 EAR ITCHING: ICD-10-CM

## 2021-06-10 DIAGNOSIS — H60.391 INFECTIVE OTITIS EXTERNA, RIGHT: ICD-10-CM

## 2021-06-10 DIAGNOSIS — H61.21 IMPACTED CERUMEN OF RIGHT EAR: ICD-10-CM

## 2021-06-10 RX ORDER — CIPROFLOXACIN AND DEXAMETHASONE 3; 1 MG/ML; MG/ML
4 SUSPENSION/ DROPS AURICULAR (OTIC) 2 TIMES DAILY
Qty: 7.5 ML | Refills: 0 | Status: SHIPPED | OUTPATIENT
Start: 2021-06-10 | End: 2021-06-17

## 2021-06-10 RX ORDER — FLUOCINONIDE 0.5 MG/G
CREAM TOPICAL
Qty: 30 G | Refills: 0 | Status: SHIPPED | OUTPATIENT
Start: 2021-06-10

## 2021-06-10 NOTE — PROGRESS NOTES
[facility] Follow Up Note    Assessment/Plan:  1. Influenza-B  Onset-Friday.  Low-grade fever chills and lack of well-being.  No evidence for pneumonia on examination.  Recommendations: Push fluids, Tylenol for aches and pains.  Watch for secondary pneumonia.  Discussed prevention with family members.  May return to work when afebrile for 24 hours off of Tylenol  - Influenza A/B Rapid Test        Yudi Gibbs MD    Chief Complaint:  Chief Complaint   Patient presents with     Chills     Fatigue       History of Present Illness:  Joe is a 51 y.o. male with a history of Crohn's disease on Remicade and mercaptopurine.  He is here today for evaluation of an influenza-like illness that started last weekend.  On Friday, he developed probable low-grade fever and chills.  He notes muscle aches and pains and a mild cough.  He states in general he feels unwell.  He denies any chest pain or shortness of breath.  He denies any upper respiratory symptoms.  He denies any gastrointestinal symptoms or new rashes.  He does state that he had a flu shot.  He denies any known exposures to influenza.  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    Review of Systems:  A comprehensive review of systems was performed and was otherwise negative    PFSH:  Social History: He is .  He is a non-smoker  History   Smoking Status     Never Smoker   Smokeless Tobacco     Former User     Types: Chew       Past History: Crohnes disease  Current Outpatient Prescriptions   Medication Sig Dispense Refill     fexofenadine (ALLEGRA) 180 MG tablet Take 180 mg by mouth as needed.       inFLIXimab (REMICADE) 100 mg injection Infuse into a venous catheter. Every 8 weeks       mercaptopurine (PURINETHOL) 50 mg tablet Take 50 mg by mouth 2 times a day at 6:00 am and 4:00 pm.       PARoxetine (PAXIL) 20 MG tablet TAKE 2 TABLETS BY MOUTH EVERY  tablet 3     hydrocortisone (WESTCORT) 0.2 % cream Apply to affected area  bid 45 g 1     No current facility-administered medications for this visit.        Family History: No one is ill at home    Physical Exam:  General Appearance:   He does not appear particularly ill but has mild tachycardia and a low-grade temp  Vitals:    04/11/17 1258   BP: 98/62   Patient Site: Left Arm   Patient Position: Sitting   Cuff Size: Adult Regular   Pulse: (!) 108   Temp: 99.4  F (37.4  C)   TempSrc: Tympanic   Weight: 195 lb 12.8 oz (88.8 kg)     Wt Readings from Last 3 Encounters:   04/11/17 195 lb 12.8 oz (88.8 kg)   07/29/16 197 lb 2 oz (89.4 kg)   10/19/15 196 lb 6.4 oz (89.1 kg)     Body mass index is 31.6 kg/(m^2).    Neck exam is performed.  Pupils are equal react to light.  Extraocular movements are intact.  Ears nose and throat are clear  Neck is supple with no significant lymphadenopathy  Lungs are clear to auscultation and percussion  Cardiac exam reveals a regular rhythm but mild tachycardia.  No murmurs noted  Skin is negative for rashes

## 2021-06-11 NOTE — TELEPHONE ENCOUNTER
Patient says he started getting some ear discharge three weeks ago, and now his ear is getting red with some discomfort.  He will see a provider within 24 hours per guidelines and do care advice per protocol.    Gerri Belle RN  Nurse Triagers    Reason for Disposition    Ear pain    Additional Information    Negative: Bloody discharge and it followed ear trauma    Negative: Followed head or face injury and clear or bloody fluid    Negative: Unexplained bleeding and lasts > 10 minutes or large amount (EXCEPTION: if a few drops of blood, continue with triage)    Negative: Fever > 103 F (39.4 C)    Negative: Patient sounds very sick or weak to the triager    Negative: [1] Bloody discharge AND [2] it followed ear trauma    Negative: [1] Followed head or face injury AND [2] clear or bloody fluid    Negative: [1] Unexplained bleeding AND [2] lasts > 10 minutes or large amount (Exception: if a few drops of blood, continue with triage)    Negative: Fever > 103 F (39.4 C)    Negative: Patient sounds very sick or weak to the triager    Negative: Diabetes mellitus or weak immune system (e.g., HIV positive, cancer chemo, splenectomy, organ transplant, chronic steroids)    Protocols used: EAR - [id number], EAR - [id number]

## 2021-06-11 NOTE — PROGRESS NOTES
" ASSESSMENT AND PLAN:    1. Cellulitis  Ear pinna, is improving with current antibiotic therapy.  Continue follow up if fails to improve.     2. Chronic otitis externa  He has a chronic itch/scratch eczema of his auditory canals. Will treat with prn use of vosol.  Try to avoid using Q tips.   - acetic acid (VOSOL) 2 % otic solution; One drop two times a day prn  Dispense: 15 mL; Refill: 5    CHIEF COMPLAINT:  Chief Complaint   Patient presents with     Follow-up     [facility], 8/29/20, ear pain     HISTORY OF PRESENT ILLNESS:  Joe Pierce is a 55 y.o. male for follow up of his right ear pinna cellulitis, from generalized otitis externa.  He uses Q tips a lot for itching in the ear canals.  No fever, now, on antibiotic therapy and he feels that the ear is improving.     REVIEW OF SYSTEMS:   See HPI, all other systems on review are negative.    Past Medical History:   Diagnosis Date     Anxiety disorder      Crohn's disease (H)      Immunosuppression (H)      Seasonal allergies      Social History     Tobacco Use   Smoking Status Never Smoker   Smokeless Tobacco Former User     Types: Chew     Family History   Problem Relation Age of Onset     Ovarian cancer Mother      Heart attack Father      Past Surgical History:   Procedure Laterality Date     APPENDECTOMY       VITALS:  Vitals:    08/31/20 1135   BP: 128/86   Patient Site: Left Arm   Patient Position: Sitting   Cuff Size: Adult Regular   Pulse: 88   Resp: 16   SpO2: 98%   Weight: 196 lb 12 oz (89.2 kg)   Height: 5' 7\" (1.702 m)     Wt Readings from Last 3 Encounters:   08/31/20 196 lb 12 oz (89.2 kg)   08/29/20 193 lb (87.5 kg)   07/09/19 197 lb 4.8 oz (89.5 kg)     PHYSICAL EXAM:  Constitutional:  In NAD, alert and oriented  HEENT: right ear pinna somewhat red and plethoric, not tender, no expanding cellulitis.   Neck: no cervical or axillary adenopathy    DECISION TO OBTAIN OLD RECORDS AND/OR OBTAIN HISTORY FROM SOMEONE OTHER THAN PATIENT, AND/OR " ACCESSING CARE EVERYWHERE):  1  0     REVIEW AND SUMMARIZATION OF OLD RECORDS, AND/OR OBTAINING HISTORY FROM SOMEONE OTHER THAN PATIENT, AND/OR DISCUSSION OF CASE WITH ANOTHER HEALTH CARE PROVIDER:  2 reviewed urgent care visit    REVIEW AND/OR ORDER OF OF CLINICAL LAB TESTS: 1  0.    REVIEW AND/OR ORDER OF RADIOLOGY TESTS: 1 0.    REVIEW AND/OR ORDER OF MEDICAL TESTS (EKG/ECHO/COLONOSCOPY/EGD): 1  0    INDEPENDENT  VISUALIZATION OF IMAGE, TRACING, OR SPECIMEN ITSELF (2 EACH):  0     TOTAL: 2    Current Outpatient Medications   Medication Sig Dispense Refill     ciprofloxacin HCl (CIPRO) 500 MG tablet Take 1.5 tablets (750 mg total) by mouth every 12 (twelve) hours for 10 days. 30 tablet 0     fexofenadine (ALLEGRA) 180 MG tablet Take 180 mg by mouth as needed.       hydrocortisone (WESTCORT) 0.2 % cream Apply to affected area bid 45 g 1     inFLIXimab (REMICADE) 100 mg injection Infuse into a venous catheter. Every 8 weeks       PARoxetine (PAXIL) 40 MG tablet Take 1 tablet (40 mg total) by mouth daily. 90 tablet 3     acetic acid (VOSOL) 2 % otic solution One drop two times a day prn 15 mL 5     Bahman Porter MD  Internal Medicine  [facility]

## 2021-06-11 NOTE — PATIENT INSTRUCTIONS - HE
1. Begin oral Cipro according to bottle instructions. You will need to break some tablets in half. Take this medication with food.   2. Seek emergency medical attention if you develop worsening pain or fever.   3. Follow up with primary care on Monday for a recheck of your ear and blood pressure.

## 2021-06-12 ENCOUNTER — OFFICE VISIT - HEALTHEAST (OUTPATIENT)
Dept: FAMILY MEDICINE | Facility: CLINIC | Age: 56
End: 2021-06-12

## 2021-06-12 DIAGNOSIS — L03.211 CELLULITIS OF FACE: ICD-10-CM

## 2021-06-17 NOTE — PROGRESS NOTES
Assessment:   1. Impacted cerumen of right ear  Discussed treatment care of the inner ear. Recommend referral to ENT due to changes to ear canal. Patient verbalized understanding.   - Ambulatory referral to ENT    2. Elevated blood pressure reading without diagnosis of hypertension  Discussed need to check blood pressure twice daily and return to the clinic for recheck and possible medication for blood pressure management.      Plan:   1. Care instructions given.  2. Home treatment: none.  3. Follow-up as needed.     Subjective:   Joe Pierce is a 55 y.o. male whom I am asked to see for evaluation of diminished hearing and discharge in the right ear for the past 3 days. There is a prior history of cerumen impaction. The patient has not been using ear drops to loosen wax immediately prior to this visit. The patient denies ear pain.    The following portions of the patient's history were reviewed and updated as appropriate: allergies, current medications, past family history, past medical history, past social history, past surgical history and problem list.    Review of Systems  A 12 point comprehensive review of systems was negative except as noted.      Objective:      Auditory canal(s) of the right ear are completely obstructed with cerumen.     Cerumen was removed using gentle irrigation. Tympanic membranes are intact following the procedure.  Auditory canals are bleeding and inflamed.

## 2021-06-22 NOTE — PROGRESS NOTES
"[facility] Note    Patient Name: Joe Pierce  Patient Age: 53 y.o.  YOB: 1965  MRN: [id number]    Date of visit: 12/17/2018    Patient Active Problem List   Diagnosis     Anxiety     Crohn's Disease     Memory Lapses Or Loss     Dermatitis     Immunosuppression (H)     Social History     Social History Narrative     Not on file     Family History   Problem Relation Age of Onset     Ovarian cancer Mother      Heart attack Father      Outpatient Encounter Medications as of 12/17/2018   Medication Sig Dispense Refill     fexofenadine (ALLEGRA) 180 MG tablet Take 180 mg by mouth as needed.       hydrocortisone (WESTCORT) 0.2 % cream Apply to affected area bid 45 g 1     inFLIXimab (REMICADE) 100 mg injection Infuse into a venous catheter. Every 8 weeks       mercaptopurine (PURINETHOL) 50 mg tablet Take 50 mg by mouth 2 times a day at 6:00 am and 4:00 pm.       PARoxetine (PAXIL) 20 MG tablet TAKE 2 TABLETS BY MOUTH EVERY  tablet 3     PARoxetine (PAXIL) 40 MG tablet TAKE 1 TABLET BY MOUTH EVERY DAY 90 tablet 1     valACYclovir (VALTREX) 1000 MG tablet Take 1 tablet (1,000 mg total) by mouth 3 (three) times a day for 7 days. 21 tablet 0     No facility-administered encounter medications on file as of 12/17/2018.        Chief Complaint:   Chief Complaint   Patient presents with     Rash     right upper chest, some pain, no itching       /78 (Patient Site: Left Arm, Patient Position: Sitting, Cuff Size: Adult Regular)   Pulse 84   Ht 5' 7\" (1.702 m)   Wt 198 lb 5 oz (90 kg)   SpO2 98%   BMI 31.06 kg/m    HPI:   Thursday and Friday noticed some burning sensation right chest wall upper.  He did not noticed a rash at that time but on Saturday he did notice a rash right upper chest wall and thoracic area.  It is minimally painful.  He has not had shingles before, has not had the shingles vaccine.  He thinks he has had the chickenpox, he does take some immunosuppressants.  No new " "medications or recent exposures.    ROS: Pertinent ros findings in hpi, all other systems negative.    Objective/Physical Exam:     /78 (Patient Site: Left Arm, Patient Position: Sitting, Cuff Size: Adult Regular)   Pulse 84   Ht 5' 7\" (1.702 m)   Wt 198 lb 5 oz (90 kg)   SpO2 98%   BMI 31.06 kg/m      Gen: NAD, appears age  Skin: warm, dry  HENT: normocephalic atraumatic, MMM  Eyes: non-icteric, no proptosis  CV: NRRR no m/r/g, no peripheral edema  Resp: CTAB no w/r/r, normal respiratory effort  Abd: non-distended, soft  Hematologic: No petechiae or purpura  MSK: no muscle or joint swelling  Neuro: no dysarthria or gross asymmetry  Psych: Cooperative, full affect    Patches of shallow sores on erythematous base somewhat of which are crusted and a to 2 dermatome on the right.  There are only 2 small areas on the back, the majority are anterior.  No evidence of infection bacterial.    Assessment/Plan:  No results found for this or any previous visit (from the past 24 hour(s)).  Medications Ordered   Medications     valACYclovir (VALTREX) 1000 MG tablet     Sig: Take 1 tablet (1,000 mg total) by mouth 3 (three) times a day for 7 days.     Dispense:  21 tablet     Refill:  0       ICD-10-CM    1. Herpes zoster without complication B02.9        We discussed vaccination, I would recommend this in 3 years.  Valacyclovir.  We discussed postherpetic neuralgia as well as what to look for for follow-up including disseminated or bacterial infection.    Patient Instructions   If any significant increase in drainage, swelling, redness, pain, fever, weakness, numbness or other concerning symptom seek medical care immediately.        Counseled patient regarding treatments, treatment options, risks and benefits and diagnosis.  The patient was interactive, attentive, verbalized understanding, and we discussed plan.     Frank Hazel MD      "

## 2021-06-26 NOTE — PROGRESS NOTES
ASSESSMENT AND PLAN  1. Cellulitis of face  cephalexin (KEFLEX) 500 MG capsule      Right ear appears to be infected given the increased swelling, redness and erythema.  He has continued otitis externa that he will use the commendation drops given to by the ENT.  The ears appearance is consistent with cellulitis likely secondary to the cut and will treat with oral antibiotics.  Follow-up if not improving or experiencing sudden worsening      20 minutes spent on the date of the encounter doing chart review, history and exam, documentation and further activities per the note    Virgil Galloway PA-C    SUBJECTIVE  Chief Complaint   Patient presents with     Ear Pain     Right ear, Saw ENT Thursday and ear is now painful down jawline and red.     HPI:  Joe Pierce is a 55 y.o. male who presents today with swollen right ear.  2 days ago he was seen at ENT and they cleaned out his ear.  Patient also reports digging in his ears with Q-tips routinely.  He noticed there is a cut that has bled a little bit.  He was placed on steroid/antibiotic combination drops for otitis externa.  And has been using this.  The ear is no swollen, painful and red    ROS:  Pertinent ROS neg other than the symptoms noted above in the HPI.     OBJECTIVE  Vitals:    06/12/21 1528   BP: 137/87   Patient Site: Left Arm   Patient Position: Sitting   Cuff Size: Adult Regular   Pulse: (!) 102   Resp: 14   Temp: 98.4  F (36.9  C)   TempSrc: Oral   SpO2: 97%   Weight: 196 lb 3.2 oz (89 kg)     Physical Exam:  General: Alert, No apparent distress, Cooperative  HENT: HEAD: ATNC,   EARS: TM pearly and nonbulging on right ear.  Purulent dried debris seen in external auditory canal, superficial cut at the entry of the auditory canal seen.  Ear is erythematous, swollen and warm      Labs:  No results found for this or any previous visit (from the past 72 hour(s)).    Radiology:  No results found.    Problem List:  2015-05: Dermatitis  Anxiety  Acute  bronchitis  Crohn's Disease  Memory Lapses Or Loss  Crohn's disease (H)  Immunosuppression (H)      Past Medical History:   Diagnosis Date     Anxiety disorder      Crohn's disease (H)      Immunosuppression (H)      Seasonal allergies        Current Outpatient Medications on File Prior to Visit   Medication Sig Dispense Refill     acetic acid (VOSOL) 2 % otic solution One drop two times a day prn 15 mL 5     ciprofloxacin-dexamethasone (CIPRODEX) otic suspension Administer 4 drops to the right ear 2 (two) times a day for 7 days. 7.5 mL 0     fexofenadine (ALLEGRA) 180 MG tablet Take 180 mg by mouth as needed.       fluocinonide (LIDEX) 0.05 % cream Apply sparingly to affected ear 3x weekly for itching 30 g 0     hydrocortisone (WESTCORT) 0.2 % cream Apply to affected area bid 45 g 1     inFLIXimab (REMICADE) 100 mg injection Infuse into a venous catheter. Every 8 weeks       PARoxetine (PAXIL) 40 MG tablet Take 1 tablet (40 mg total) by mouth daily. 90 tablet 3     No current facility-administered medications on file prior to visit.         Social History     Tobacco Use     Smoking status: Never Smoker     Smokeless tobacco: Former User     Types: Chew   Substance Use Topics     Alcohol use: Yes     Alcohol/week: 9.0 standard drinks     Types: 9 Cans of beer per week       The plan of care was discussed with the patient. They understand and agree with the course of treatment prescribed. A printed summary was given including instructions and medications.  The use of Dragon/DuraS[employer]eper dictation services may have been used to construct the content in this note; any grammatical or spelling errors are non-intentional. Please contact the author of this note directly if you are in need of any clarification.

## 2021-06-26 NOTE — PROGRESS NOTES
CHIEF COMPLAINT:     Chief Complaint   Patient presents with     Ear Problem     Right ear drainage- has had it cleaned twice in the past year            HISTORY OF PRESENT ILLNESS  Drainage and itching in his right ear.  Patient does admit to using Q-tips which makes the itching better but does not completely make it go away.  Denies any dizziness or hearing loss.  No history of otologic disease or otologic surgery no ear no ear no other ear nose or throat related problems.  Joe Pierce   was seen at the behest of Sheba Cochran FNP  for   Chief Complaint   Patient presents with     Ear Problem     Right ear drainage- has had it cleaned twice in the past year                 REVIEW OF SYSTEMS    Review of Systems: a 10-system review is reviewed at this encounter.  See scanned document.         PHYSICAL EXAM:        HEAD: Normal appearance and symmetry:  No cutaneous lesions.      EARS:    Examination the right ear under the binocular microscope shows significant amount of desquamated debris that is that is removed with a #5 suction.  All this is along with its greatest is inspissated inspissated cerumen.  The walls of the external auditory canal are inflamed but there is no purulent discharge the TM is intact    Left ear was then is it within normal limits with an intact tympanic membrane         NOSE:    Dorsum:   straight  Septum: normal  Mucosa:  moist  Inferior turbinates:  normal       ORAL CAVITY/OROPHARYNX:    Lips:  Normal.  Tongue: normal, midline  Mucosa:   no lesions  Tonsils:  1+      NECK:  Trachea:  midline.   Thyroid:  normal   Adenopathy:  none       NEURO:   Alert and Oriented       RESPIRATORY:   Symmetry and Respiratory effort    PSYCH:   normal mood and affect    SKIN:  warm and dry         IMPRESSION:    Encounter Diagnoses   Name Primary?     Infective otitis externa, right Yes     Ear itching        RECOMMENDATIONS:    No orders of the defined types were placed in this encounter.      Medications Ordered   Medications     ciprofloxacin-dexamethasone (CIPRODEX) otic suspension     Sig: Administer 4 drops to the right ear 2 (two) times a day for 7 days.     Dispense:  7.5 mL     Refill:  0     fluocinonide (LIDEX) 0.05 % cream     Sig: Apply sparingly to affected ear 3x weekly for itching     Dispense:  30 g     Refill:  0      Status post debridement of right ear canal with removal of cerumen.  Patient will be started on Ciprodex otic drops as directed for 1 week and then Lidex cream as needed for itching.  Patient is cautioned cautioned not to instrument the ears follow-up with an audiogram and

## 2021-06-29 NOTE — PROGRESS NOTES
Progress Notes by Jo Ann Leone PA-C at 8/29/2020 11:10 AM     Author: Jo Ann Leone PA-C Service: -- Author Type: Physician Assistant    Filed: 8/29/2020 12:14 PM Encounter Date: 8/29/2020 Status: Signed    : Jo Ann Leone PA-C (Physician Assistant)       Chief Complaint   Patient presents with   ? Ear Pain     Right ear pain        HPI:  Joe Pierce is a 55 y.o. male with past medical history of Crohn's disease on Remicade who presents today complaining of right ear pain 2 days.  Patient states that he had an irritation on the inside of his right ear about a week ago.  He had been picking at it and using a Q-tip and there was some intermittent bleeding and discharge.  He also had been applying a topical hydrocortisone because he thought it may be a little bit of dry/irritated skin.  He also reports a history of eczema.  Over the last 24 hours he has had some external ear pain and redness and swelling.  He has previously had this type of infection before.  The first time he was diagnosed with this type of infection he was hospitalized for treatment.  The second time he was treated outpatient with oral prednisone which seemed to resolve the condition.  Patient denies any severe pain or fevers.    History obtained from the patient.    Problem List:  2015-05: Dermatitis  Anxiety  Acute bronchitis  Crohn's Disease  Memory Lapses Or Loss  Crohn's disease (H)  Immunosuppression (H)      Past Medical History:   Diagnosis Date   ? Anxiety disorder    ? Crohn's disease (H)    ? Immunosuppression (H)    ? Seasonal allergies        Social History     Tobacco Use   ? Smoking status: Never Smoker   ? Smokeless tobacco: Former User     Types: Chew   Substance Use Topics   ? Alcohol use: Yes     Alcohol/week: 9.0 standard drinks     Types: 9 Cans of beer per week       Review of Systems   Constitutional: Negative for chills and fever.   HENT: Positive for ear discharge (Small amount of bleeding when picking) and  ear pain (Right external). Negative for congestion.    Respiratory: Negative for cough.    All other systems reviewed and are negative.      Vitals:    08/29/20 1111 08/29/20 1113   BP: (!) 173/103 (!) 162/107   Patient Site: Left Arm Left Arm   Patient Position: Sitting Sitting   Cuff Size: Adult Regular Adult Regular   Pulse: 90    Resp: 18    Temp: 98  F (36.7  C)    TempSrc: Oral    SpO2: 99%    Weight: 193 lb (87.5 kg)        Physical Exam  Vitals signs and nursing note reviewed.   Constitutional:       General: He is not in acute distress.     Appearance: He is well-developed. He is not diaphoretic.   HENT:      Head: Normocephalic and atraumatic.      Right Ear: External ear normal. There is impacted cerumen (Resolved using irrigation).      Left Ear: Tympanic membrane, ear canal and external ear normal. There is impacted cerumen (Resolved using irrigation).      Ears:      Comments: Right external ear has erythema, swelling, and tenderness to palpation over the cartilaginous portion of the ear.  No swelling or tenderness over the lobe.  There is no current significant deformity or break in the skin.  No signs of trauma or hematoma  Eyes:      General:         Right eye: No discharge.         Left eye: No discharge.      Conjunctiva/sclera: Conjunctivae normal.   Pulmonary:      Effort: Pulmonary effort is normal. No respiratory distress.   Neurological:      Mental Status: He is alert.   Psychiatric:         Behavior: Behavior normal.         Thought Content: Thought content normal.         Judgment: Judgment normal.             Clinical Decision Making:  Suspect right perichondritis secondary to break in the skin from internal ear canal irritation.  Patient was started on treatment with oral high-dose ciprofloxacin for Pseudomonas coverage.  No physical exam findings consistent with necrotic tissue or deformity.  Recommend close follow-up for monitoring.  Patient was scheduled a follow-up visit in 48  hours.  At the end of the encounter, I discussed results, diagnosis, medications. Discussed red flags for immediate return to clinic/ER, as well as indications for follow up if no improvement. Patient understood and agreed to plan. Patient was stable for discharge.    1. Perichondritis  ciprofloxacin HCl (CIPRO) 500 MG tablet   2. Bilateral impacted cerumen  Nursing communication         Patient Instructions   1. Begin oral Cipro according to bottle instructions. You will need to break some tablets in half. Take this medication with food.   2. Seek emergency medical attention if you develop worsening pain or fever.   3. Follow up with primary care on Monday for a recheck of your ear and blood pressure.

## 2021-07-04 NOTE — PATIENT INSTRUCTIONS - HE
Patient Instructions by Virgil Galloway PA-C at 6/12/2021  3:30 PM     Author: Virgil Galloway PA-C Service: -- Author Type: Physician Assistant    Filed: 6/12/2021  3:47 PM Encounter Date: 6/12/2021 Status: Signed    : Virgil Galloway PA-C (Physician Assistant)         Patient Education     Discharge Instructions for Cellulitis  You have been diagnosed with cellulitis. This is an infection in the deepest layer of the skin and tissue beneath the skin. In some cases, the infection also affects the muscle. Cellulitis is caused by bacteria. The bacteria can enter the body through broken skin. This can happen with a cut, scratch, animal bite, or an insect bite that has been scratched. You may have been treated in the hospital with antibiotics and fluids. You will likely be given a prescription for antibiotics to take at home. This sheet will help you take care of yourself at home.  Home care  When you are home:    Take the prescribed antibiotic medicine you are given as directed until it is gone. Take it even if you feel better. It treats the infection and stops it from returning. Not taking all the medicine can make future infections hard to treat.    Keep the infected area clean.    When possible, raise the infected area above the level of your heart. This helps keep swelling down.    Talk with your healthcare provider if you are in pain. Ask what kind of over-the-counter medicine you can take for pain.    Apply clean bandages as advised.    Take your temperature once a day for a week.    Wash your hands often to prevent spreading the infection.  In the future, wash your hands before and after you touch cuts, scratches, or bandages. This will help prevent infection.   When to call your healthcare provider  Call your healthcare provider right away if you have any of the following:    Trouble or pain when moving the joints above or below the infected area    Discharge or pus draining from the  area    Fever of 100.4 F (38 C) or higher, or as directed by your healthcare provider    Pain that gets worse in or around the infected     Redness that gets worse in or around the infected area, particularly if the area of redness expands to a wider area    Shaking chills    Swelling of the infected area    Vomiting  Date Last Reviewed: 8/1/2016 2000-2019 [employer] [address] [place] [address]. All rights reserved. This information is not intended as a substitute for professional medical care. Always follow your healthcare professional's instructions.

## 2021-07-04 NOTE — PATIENT INSTRUCTIONS - HE
"Patient Instructions by Clarence Ventura MD at 6/10/2021 11:00 AM     Author: Clarence Ventura MD Service: -- Author Type: Physician    Filed: 6/10/2021 11:27 AM Encounter Date: 6/10/2021 Status: Signed    : Clarence Ventura MD (Physician)       Ciprodex drops as directed  Then, use lidex cream 2-3x a week for itching  Return visit 4 weeks        Bend wire to 90 degrees so that the \"elbow) is just at the point where the cotton swab begins.  (this will prevent you from putting the cotton tip in too far and damaging the eardrum)  Apply small amount of steroid cream to cotton tip.  The cream is potent so you only need a small dab on the swab.   [employer] stocks these for $ 20-30 per box of 50       "

## 2021-07-06 VITALS
OXYGEN SATURATION: 97 % | HEART RATE: 102 BPM | WEIGHT: 196.2 LBS | RESPIRATION RATE: 14 BRPM | DIASTOLIC BLOOD PRESSURE: 87 MMHG | TEMPERATURE: 98.4 F | SYSTOLIC BLOOD PRESSURE: 137 MMHG | BODY MASS INDEX: 30.73 KG/M2

## 2021-07-06 VITALS
BODY MASS INDEX: 30.53 KG/M2 | HEART RATE: 84 BPM | DIASTOLIC BLOOD PRESSURE: 94 MMHG | WEIGHT: 194.9 LBS | SYSTOLIC BLOOD PRESSURE: 156 MMHG

## 2021-07-07 ENCOUNTER — RECORDS - HEALTHEAST (OUTPATIENT)
Dept: ADMINISTRATIVE | Facility: OTHER | Age: 56
End: 2021-07-07

## 2021-07-07 ENCOUNTER — TRANSFERRED RECORDS (OUTPATIENT)
Dept: HEALTH INFORMATION MANAGEMENT | Facility: CLINIC | Age: 56
End: 2021-07-07

## 2021-07-14 ENCOUNTER — OFFICE VISIT (OUTPATIENT)
Dept: OTOLARYNGOLOGY | Facility: CLINIC | Age: 56
End: 2021-07-14
Payer: COMMERCIAL

## 2021-07-14 DIAGNOSIS — H60.541 ECZEMA OF EXTERNAL EAR, RIGHT: ICD-10-CM

## 2021-07-14 DIAGNOSIS — H91.93 DIFFICULTY HEARING, BILATERAL: ICD-10-CM

## 2021-07-14 DIAGNOSIS — L29.9 EAR ITCHING: Primary | ICD-10-CM

## 2021-07-14 PROCEDURE — 99213 OFFICE O/P EST LOW 20 MIN: CPT | Performed by: OTOLARYNGOLOGY

## 2021-07-14 NOTE — LETTER
7/14/2021         RE: Joe Pierce  [address]  [facility] [facility] [address]        Dear Colleague,    Thank you for referring your patient, Joe Pierce, to the [facility]. Please see a copy of my visit note below.    CHIEF COMPLAINT:  Recheck      HISTORY OF PRESENT ILLNESS    Joe was seen in follow up for recheck of right ear.  He is no longer having any pain or in the right ear.  He does have some difficulty hearing in both ears that has been going on for some time.  No tinnitus or dizziness.  He does admit to using instrumenting the ears with Q-tips.  He has been using the steroid cream with good effect for the itching.         REVIEW OF SYSTEMS    Review of Systems: a 10-system review is reviewed at this encounter.  See scanned document.     Patient has no known allergies.     PHYSICAL EXAM:        HEAD: Normal appearance and symmetry:  No cutaneous lesions.      EARS:   Auricles normal    EAC right ear mildly inflamed, no debris  EAC left normal  Both TMs intact     NOSE:    Dorsum:   straight       ORAL CAVITY/OROPHARYNX:    Lips:  Normal.     NECK:  Trachea:  midline       NEURO:   Alert and Oriented    GAIT AND STATION:  normal     RESPIRATORY:   Symmetry and Respiratory effort    PSYCH:   normal mood and affect    SKIN:  warm and dry         IMPRESSION:     Diagnoses       Codes Comments    Ear itching    -  Primary L29.9     Eczema of external ear, right     H60.541                RECOMMENDATIONS:    No orders of the defined types were placed in this encounter.    Return visit 3 months with baseline audiogram.  Recommend water precautions.    Steroid cream 2-3 times weekly as needed for itching           Again, thank you for allowing me to participate in the care of your patient.        Sincerely,        Clarence Ventura MD

## 2021-07-14 NOTE — PROGRESS NOTES
CHIEF COMPLAINT:  Recheck      HISTORY OF PRESENT ILLNESS    Joe was seen in follow up for recheck of right ear.  He is no longer having any pain or in the right ear.  He does have some difficulty hearing in both ears that has been going on for some time.  No tinnitus or dizziness.  He does admit to using instrumenting the ears with Q-tips.  He has been using the steroid cream with good effect for the itching.         REVIEW OF SYSTEMS    Review of Systems: a 10-system review is reviewed at this encounter.  See scanned document.     Patient has no known allergies.     PHYSICAL EXAM:        HEAD: Normal appearance and symmetry:  No cutaneous lesions.      EARS:   Auricles normal    EAC right ear mildly inflamed, no debris  EAC left normal  Both TMs intact     NOSE:    Dorsum:   straight       ORAL CAVITY/OROPHARYNX:    Lips:  Normal.     NECK:  Trachea:  midline       NEURO:   Alert and Oriented    GAIT AND STATION:  normal     RESPIRATORY:   Symmetry and Respiratory effort    PSYCH:   normal mood and affect    SKIN:  warm and dry         IMPRESSION:     Diagnoses       Codes Comments    Ear itching    -  Primary L29.9     Eczema of external ear, right     H60.541                RECOMMENDATIONS:    No orders of the defined types were placed in this encounter.    Return visit 3 months with baseline audiogram.  Recommend water precautions.    Steroid cream 2-3 times weekly as needed for itching

## 2021-08-11 ENCOUNTER — TRANSFERRED RECORDS (OUTPATIENT)
Dept: HEALTH INFORMATION MANAGEMENT | Facility: CLINIC | Age: 56
End: 2021-08-11

## 2021-08-15 ENCOUNTER — HEALTH MAINTENANCE LETTER (OUTPATIENT)
Age: 56
End: 2021-08-15

## 2021-10-04 ENCOUNTER — IMMUNIZATION (OUTPATIENT)
Dept: FAMILY MEDICINE | Facility: CLINIC | Age: 56
End: 2021-10-04
Payer: COMMERCIAL

## 2021-10-04 PROCEDURE — 90682 RIV4 VACC RECOMBINANT DNA IM: CPT

## 2021-10-04 PROCEDURE — 90471 IMMUNIZATION ADMIN: CPT

## 2021-10-11 ENCOUNTER — IMMUNIZATION (OUTPATIENT)
Dept: NURSING | Facility: CLINIC | Age: 56
End: 2021-10-11
Payer: COMMERCIAL

## 2021-10-11 PROCEDURE — 91300 PR COVID VAC PFIZER DIL RECON 30 MCG/0.3 ML IM: CPT

## 2021-10-11 PROCEDURE — 0003A PR COVID VAC PFIZER DIL RECON 30 MCG/0.3 ML IM: CPT

## 2021-10-27 ENCOUNTER — TRANSFERRED RECORDS (OUTPATIENT)
Dept: HEALTH INFORMATION MANAGEMENT | Facility: CLINIC | Age: 56
End: 2021-10-27
Payer: COMMERCIAL

## 2021-10-27 LAB
ALT SERPL-CCNC: 32 LU/L (ref 0–44)
AST SERPL-CCNC: 33 LU/L (ref 0–40)

## 2021-11-10 DIAGNOSIS — F41.9 ANXIETY: ICD-10-CM

## 2021-11-11 RX ORDER — PAROXETINE 40 MG/1
40 TABLET, FILM COATED ORAL DAILY
Qty: 90 TABLET | Refills: 3 | Status: SHIPPED | OUTPATIENT
Start: 2021-11-11 | End: 2021-12-24

## 2021-12-22 ENCOUNTER — TRANSFERRED RECORDS (OUTPATIENT)
Dept: HEALTH INFORMATION MANAGEMENT | Facility: CLINIC | Age: 56
End: 2021-12-22
Payer: COMMERCIAL

## 2021-12-22 LAB
ALT SERPL-CCNC: 35 IU/L (ref 0–44)
AST SERPL-CCNC: 31 IU/L (ref 0–40)

## 2021-12-24 ENCOUNTER — OFFICE VISIT (OUTPATIENT)
Dept: FAMILY MEDICINE | Facility: CLINIC | Age: 56
End: 2021-12-24
Payer: COMMERCIAL

## 2021-12-24 VITALS
HEART RATE: 68 BPM | HEIGHT: 66 IN | BODY MASS INDEX: 31.88 KG/M2 | TEMPERATURE: 98.3 F | WEIGHT: 198.4 LBS | SYSTOLIC BLOOD PRESSURE: 136 MMHG | DIASTOLIC BLOOD PRESSURE: 88 MMHG | RESPIRATION RATE: 12 BRPM

## 2021-12-24 DIAGNOSIS — Z11.59 NEED FOR HEPATITIS C SCREENING TEST: ICD-10-CM

## 2021-12-24 DIAGNOSIS — D84.9 IMMUNOSUPPRESSION (H): ICD-10-CM

## 2021-12-24 DIAGNOSIS — Z11.4 SCREENING FOR HIV (HUMAN IMMUNODEFICIENCY VIRUS): ICD-10-CM

## 2021-12-24 DIAGNOSIS — Z00.00 ROUTINE GENERAL MEDICAL EXAMINATION AT A HEALTH CARE FACILITY: Primary | ICD-10-CM

## 2021-12-24 DIAGNOSIS — F41.1 ANXIETY STATE: ICD-10-CM

## 2021-12-24 DIAGNOSIS — Z13.1 DIABETES MELLITUS SCREENING: ICD-10-CM

## 2021-12-24 DIAGNOSIS — Z12.5 PROSTATE CANCER SCREENING: ICD-10-CM

## 2021-12-24 DIAGNOSIS — Z13.220 LIPID SCREENING: ICD-10-CM

## 2021-12-24 DIAGNOSIS — K50.90 CROHN'S DISEASE WITHOUT COMPLICATION, UNSPECIFIED GASTROINTESTINAL TRACT LOCATION (H): ICD-10-CM

## 2021-12-24 LAB
ANION GAP SERPL CALCULATED.3IONS-SCNC: 9 MMOL/L (ref 5–18)
BUN SERPL-MCNC: 10 MG/DL (ref 8–22)
CALCIUM SERPL-MCNC: 9.4 MG/DL (ref 8.5–10.5)
CHLORIDE BLD-SCNC: 107 MMOL/L (ref 98–107)
CHOLEST SERPL-MCNC: 198 MG/DL
CO2 SERPL-SCNC: 22 MMOL/L (ref 22–31)
CREAT SERPL-MCNC: 0.79 MG/DL (ref 0.7–1.3)
FASTING STATUS PATIENT QL REPORTED: YES
GFR SERPL CREATININE-BSD FRML MDRD: >90 ML/MIN/1.73M2
GLUCOSE BLD-MCNC: 87 MG/DL (ref 70–125)
HDLC SERPL-MCNC: 70 MG/DL
HIV 1+2 AB+HIV1 P24 AG SERPL QL IA: NEGATIVE
LDLC SERPL CALC-MCNC: 107 MG/DL
POTASSIUM BLD-SCNC: 4.5 MMOL/L (ref 3.5–5)
PSA SERPL-MCNC: 4.19 UG/L (ref 0–3.5)
SODIUM SERPL-SCNC: 138 MMOL/L (ref 136–145)
TRIGL SERPL-MCNC: 107 MG/DL

## 2021-12-24 PROCEDURE — 36415 COLL VENOUS BLD VENIPUNCTURE: CPT | Performed by: FAMILY MEDICINE

## 2021-12-24 PROCEDURE — 80048 BASIC METABOLIC PNL TOTAL CA: CPT | Performed by: FAMILY MEDICINE

## 2021-12-24 PROCEDURE — 86803 HEPATITIS C AB TEST: CPT | Performed by: FAMILY MEDICINE

## 2021-12-24 PROCEDURE — G0103 PSA SCREENING: HCPCS | Performed by: FAMILY MEDICINE

## 2021-12-24 PROCEDURE — 99396 PREV VISIT EST AGE 40-64: CPT | Performed by: FAMILY MEDICINE

## 2021-12-24 PROCEDURE — 80061 LIPID PANEL: CPT | Performed by: FAMILY MEDICINE

## 2021-12-24 PROCEDURE — 87389 HIV-1 AG W/HIV-1&-2 AB AG IA: CPT | Performed by: FAMILY MEDICINE

## 2021-12-24 RX ORDER — PHENOL 1.4 %
1 AEROSOL, SPRAY (ML) MUCOUS MEMBRANE DAILY
COMMUNITY

## 2021-12-24 RX ORDER — CHLORAL HYDRATE 500 MG
2 CAPSULE ORAL DAILY
COMMUNITY

## 2021-12-24 RX ORDER — CLOBETASOL PROPIONATE 0.5 MG/ML
SOLUTION TOPICAL
COMMUNITY
Start: 2021-01-05

## 2021-12-24 ASSESSMENT — ANXIETY QUESTIONNAIRES
3. WORRYING TOO MUCH ABOUT DIFFERENT THINGS: SEVERAL DAYS
6. BECOMING EASILY ANNOYED OR IRRITABLE: NOT AT ALL
IF YOU CHECKED OFF ANY PROBLEMS ON THIS QUESTIONNAIRE, HOW DIFFICULT HAVE THESE PROBLEMS MADE IT FOR YOU TO DO YOUR WORK, TAKE CARE OF THINGS AT HOME, OR GET ALONG WITH OTHER PEOPLE: NOT DIFFICULT AT ALL
2. NOT BEING ABLE TO STOP OR CONTROL WORRYING: NOT AT ALL
1. FEELING NERVOUS, ANXIOUS, OR ON EDGE: NOT AT ALL
7. FEELING AFRAID AS IF SOMETHING AWFUL MIGHT HAPPEN: NOT AT ALL
5. BEING SO RESTLESS THAT IT IS HARD TO SIT STILL: NOT AT ALL
4. TROUBLE RELAXING: NOT AT ALL
GAD7 TOTAL SCORE: 1

## 2021-12-24 ASSESSMENT — MIFFLIN-ST. JEOR: SCORE: 1682.69

## 2021-12-24 ASSESSMENT — PATIENT HEALTH QUESTIONNAIRE - PHQ9: SUM OF ALL RESPONSES TO PHQ QUESTIONS 1-9: 1

## 2021-12-24 NOTE — PROGRESS NOTES
SUBJECTIVE:   CC: Joe Pierce is an 56 year old male who presents for preventative health visit.       Patient has been advised of split billing requirements and indicates understanding: Yes  Denies any chest pain, shortness of breath, dyspnea exertion, palpitations, nausea or vomiting.  Denies any changes in vision or hearing, or urinary or bowel habits.      Of note, he quit taking his Paxil approximately 2 months ago.  Of note he was started on Paxil around the time that his marriage was having some difficulties.  Since he started the medication things have been stable and has progressed through a divorce custody and now life is in a very routine and consistent mode.  He forgot to  a refill and went approximately a week without the medication and with that he was feeling fine so he continued to stay off.  JENNI-7 and PHQ-9 reviewed today and shows that he is staying in remission    Healthy Habits:     Getting at least 3 servings of Calcium per day:  Yes    Bi-annual eye exam:  Yes    Dental care twice a year:  Yes    Sleep apnea or symptoms of sleep apnea:  None    Diet:  Regular (no restrictions)    Frequency of exercise:  2-3 days/week    Duration of exercise:  Less than 15 minutes    Taking medications regularly:  Yes    Barriers to taking medications:  None    Medication side effects:  Not applicable    PHQ-2 Total Score: 0    Additional concerns today:  No        Today's PHQ-2 Score:   PHQ-2 ( 1999 [employer]) 12/21/2021   Q1: Little interest or pleasure in doing things 0   Q2: Feeling down, depressed or hopeless 0   PHQ-2 Score 0   Q1: Little interest or pleasure in doing things Not at all   Q2: Feeling down, depressed or hopeless Not at all   PHQ-2 Score 0       Abuse: Current or Past(Physical, Sexual or Emotional)- No  Do you feel safe in your environment? Yes        Social History     Tobacco Use     Smoking status: Never Smoker     Smokeless tobacco: Former User     Types: Chew   Substance Use  "Topics     Alcohol use: Yes     Alcohol/week: 9.0 standard drinks     Types: 9 Cans of beer per week       Alcohol Use 12/21/2021   Prescreen: >3 drinks/day or >7 drinks/week? No     Last PSA: No results found for: PSA    Reviewed orders with patient. Reviewed health maintenance and updated orders accordingly - Yes  Lab work is in process    Reviewed and updated as needed this visit by clinical staff  Tobacco  Allergies  Meds  Problems  Med Hx  Surg Hx  Fam Hx  Soc Hx         Reviewed and updated as needed this visit by Provider  Tobacco  Allergies  Meds  Problems  Med Hx  Surg Hx  Fam Hx          Review of Systems   All other systems reviewed and are negative.      OBJECTIVE:   /88 (BP Location: Left arm, Patient Position: Sitting, Cuff Size: Adult Large)   Pulse 68   Temp 98.3  F (36.8  C) (Oral)   Resp 12   Ht 1.676 m (5' 6\")   Wt 90 kg (198 lb 6.4 oz)   BMI 32.02 kg/m      Physical Exam  Vitals and nursing note reviewed.   Constitutional:       General: He is not in acute distress.     Appearance: Normal appearance.   HENT:      Head: Normocephalic and atraumatic.      Right Ear: Tympanic membrane, ear canal and external ear normal.      Left Ear: Tympanic membrane, ear canal and external ear normal.      Nose: Nose normal.      Mouth/Throat:      Mouth: Mucous membranes are moist.      Pharynx: Oropharynx is clear. No oropharyngeal exudate.   Eyes:      General: No scleral icterus.     Extraocular Movements: Extraocular movements intact.      Conjunctiva/sclera: Conjunctivae normal.   Neck:      Vascular: No carotid bruit.   Cardiovascular:      Rate and Rhythm: Normal rate and regular rhythm.      Pulses: Normal pulses.      Heart sounds: Normal heart sounds. No murmur heard.  No friction rub. No gallop.    Pulmonary:      Effort: Pulmonary effort is normal.      Breath sounds: Normal breath sounds. No wheezing, rhonchi or rales.   Musculoskeletal:         General: No swelling. Normal " range of motion.      Cervical back: Normal range of motion.      Right lower leg: No edema.      Left lower leg: No edema.   Skin:     General: Skin is warm and dry.      Capillary Refill: Capillary refill takes less than 2 seconds.      Coloration: Skin is not jaundiced.      Findings: No rash.   Neurological:      General: No focal deficit present.      Mental Status: He is alert and oriented to person, place, and time.      Cranial Nerves: No cranial nerve deficit.      Gait: Gait is intact. Gait normal.      Deep Tendon Reflexes:      Reflex Scores:       Bicep reflexes are 2+ on the right side and 2+ on the left side.       Patellar reflexes are 2+ on the right side and 2+ on the left side.  Psychiatric:         Mood and Affect: Mood normal.         Thought Content: Thought content normal.           ASSESSMENT/PLAN:     Problem List Items Addressed This Visit        Digestive    Crohn's Disease     Followed through [facility].  On Remicade and doing very well.  Most recent labs were reviewed.            Immune    Immunosuppression (H)     On Remicade for treatment of Crohn's disease.  Doing very well with current therapy.         Relevant Orders    Basic metabolic panel  (Ca, Cl, CO2, Creat, Gluc, K, Na, BUN)       Other    Anxiety     With changes in lifestyle and household patient is doing very well off medication.  Recommend he stay off medication and continue with exercise and self-care routine.           Other Visit Diagnoses     Routine general medical examination at a health care facility    -  Primary    Screening for HIV (human immunodeficiency virus)        As per USPSTF guidelines    Relevant Orders    HIV Antigen Antibody Combo    Need for hepatitis C screening test        As per USPSTF guidelines    Relevant Orders    Hepatitis C Screen Reflex to HCV RNA Quant and Genotype    Diabetes mellitus screening        Relevant Orders    Basic metabolic panel  (Ca, Cl, CO2, Creat, Gluc, K, Na, BUN)     "Lipid screening        Relevant Orders    Lipid panel reflex to direct LDL Fasting    Prostate cancer screening        Relevant Orders    Prostate Specific Antigen Screen          Patient has been advised of split billing requirements and indicates understanding: Yes  COUNSELING:   Reviewed preventive health counseling, as reflected in patient instructions       Regular exercise       Healthy diet/nutrition       Consider Hep C screening for all patients one time for ages 18-79 years       HIV screeninx in teen years, 1x in adult years, and at intervals if high risk       Prostate cancer screening       Advance Care Planning    Estimated body mass index is 32.02 kg/m  as calculated from the following:    Height as of this encounter: 1.676 m (5' 6\").    Weight as of this encounter: 90 kg (198 lb 6.4 oz).     Weight management plan: Discussed healthy diet and exercise guidelines    He reports that he has never smoked. He has quit using smokeless tobacco.  His smokeless tobacco use included chew.      Counseling Resources:  ATP IV Guidelines  Pooled Cohorts Equation Calculator  FRAX Risk Assessment  ICSI Preventive Guidelines  Dietary Guidelines for Americans,   USDA's MyPlate  ASA Prophylaxis  Lung CA Screening    Quentin Shipley DO  [facility]  "

## 2021-12-24 NOTE — ASSESSMENT & PLAN NOTE
With changes in lifestyle and household patient is doing very well off medication.  Recommend he stay off medication and continue with exercise and self-care routine.

## 2021-12-25 ASSESSMENT — ANXIETY QUESTIONNAIRES: GAD7 TOTAL SCORE: 1

## 2021-12-27 LAB — HCV AB SERPL QL IA: NONREACTIVE

## 2022-02-16 ENCOUNTER — TRANSFERRED RECORDS (OUTPATIENT)
Dept: HEALTH INFORMATION MANAGEMENT | Facility: CLINIC | Age: 57
End: 2022-02-16
Payer: COMMERCIAL

## 2022-04-13 ENCOUNTER — TRANSFERRED RECORDS (OUTPATIENT)
Dept: HEALTH INFORMATION MANAGEMENT | Facility: CLINIC | Age: 57
End: 2022-04-13
Payer: COMMERCIAL

## 2022-06-08 ENCOUNTER — TRANSFERRED RECORDS (OUTPATIENT)
Dept: HEALTH INFORMATION MANAGEMENT | Facility: CLINIC | Age: 57
End: 2022-06-08
Payer: COMMERCIAL

## 2022-06-08 LAB
ALT SERPL-CCNC: 40 IU/L (ref 0–44)
AST SERPL-CCNC: 41 IU/L (ref 0–40)

## 2022-07-20 ENCOUNTER — TRANSFERRED RECORDS (OUTPATIENT)
Dept: HEALTH INFORMATION MANAGEMENT | Facility: CLINIC | Age: 57
End: 2022-07-20

## 2022-08-03 ENCOUNTER — TRANSFERRED RECORDS (OUTPATIENT)
Dept: HEALTH INFORMATION MANAGEMENT | Facility: CLINIC | Age: 57
End: 2022-08-03

## 2022-08-08 ENCOUNTER — ANCILLARY PROCEDURE (OUTPATIENT)
Dept: BONE DENSITY | Facility: CLINIC | Age: 57
End: 2022-08-08
Attending: PHYSICIAN ASSISTANT
Payer: COMMERCIAL

## 2022-08-08 DIAGNOSIS — K50.10 CROHN'S DISEASE OF COLON WITHOUT COMPLICATION (H): ICD-10-CM

## 2022-08-08 PROCEDURE — 77080 DXA BONE DENSITY AXIAL: CPT | Mod: 59 | Performed by: INTERNAL MEDICINE

## 2022-08-08 PROCEDURE — 77081 DXA BONE DENSITY APPENDICULR: CPT | Performed by: INTERNAL MEDICINE

## 2022-08-12 ENCOUNTER — TRANSFERRED RECORDS (OUTPATIENT)
Dept: HEALTH INFORMATION MANAGEMENT | Facility: CLINIC | Age: 57
End: 2022-08-12

## 2022-09-18 ENCOUNTER — HEALTH MAINTENANCE LETTER (OUTPATIENT)
Age: 57
End: 2022-09-18

## 2022-09-28 ENCOUNTER — TRANSFERRED RECORDS (OUTPATIENT)
Dept: HEALTH INFORMATION MANAGEMENT | Facility: CLINIC | Age: 57
End: 2022-09-28

## 2022-10-06 ENCOUNTER — IMMUNIZATION (OUTPATIENT)
Dept: FAMILY MEDICINE | Facility: CLINIC | Age: 57
End: 2022-10-06
Payer: COMMERCIAL

## 2022-10-06 PROCEDURE — 0124A COVID-19,PF,PFIZER BOOSTER BIVALENT: CPT

## 2022-10-06 PROCEDURE — 91312 COVID-19,PF,PFIZER BOOSTER BIVALENT: CPT

## 2022-11-23 ENCOUNTER — TRANSFERRED RECORDS (OUTPATIENT)
Dept: HEALTH INFORMATION MANAGEMENT | Facility: CLINIC | Age: 57
End: 2022-11-23

## 2022-11-23 LAB
ALT SERPL-CCNC: 36 IU/L (ref 0–44)
AST SERPL-CCNC: 39 IU/L (ref 0–40)

## 2022-12-13 DIAGNOSIS — F41.9 ANXIETY: ICD-10-CM

## 2022-12-14 RX ORDER — PAROXETINE 40 MG/1
40 TABLET, FILM COATED ORAL DAILY
Qty: 90 TABLET | Refills: 3 | OUTPATIENT
Start: 2022-12-14

## 2022-12-14 NOTE — TELEPHONE ENCOUNTER
Outpatient Medication Detail     Disp Refills Start End TAI   PARoxetine (PAXIL) 40 MG tablet (Discontinued) 90 tablet 3 11/11/2021 12/24/2021 No   Sig - Route: Take 1 tablet (40 mg) by mouth daily - Oral   Sent to pharmacy as: PARoxetine HCl 40 MG Oral Tablet (PAXIL)   Class: E-Prescribe   Reason for Discontinue: Therapy completed   Order: [id number]   E-Prescribing Status: Receipt confirmed by pharmacy (11/11/2021  7:23 AM CST)

## 2022-12-22 DIAGNOSIS — F41.9 ANXIETY: ICD-10-CM

## 2022-12-22 RX ORDER — PAROXETINE 40 MG/1
40 TABLET, FILM COATED ORAL DAILY
Qty: 90 TABLET | Refills: 0 | Status: SHIPPED | OUTPATIENT
Start: 2022-12-22 | End: 2023-01-04

## 2022-12-22 NOTE — TELEPHONE ENCOUNTER
Please advise as covering if you will bridge paxil to 12/30/22 appointment. Last OV 12/24/21  Went off med in 12/2021 and restarted 01/2022. Now out of med. Previously prescribed Paxil 40mg, pended below.

## 2022-12-22 NOTE — TELEPHONE ENCOUNTER
General Call    Contacts       Type Contact Phone/Fax    12/22/2022 10:18 AM CST Phone (Incoming) Joe Pierce (Self) [phone] (M)        Reason for Call:  Medication Request    What are your questions or concerns:  Patient reported he discontinued PARoxetine (PAXIL) 40 MG tablet.at last office visit, 12/24/2021. Patient states he restarted medication in January 2022 and needs refill. Patient has medication check 12/30/2022 but he is out of medication and is requesting bridge. Please advise.    Date of last appointment with provider: 12/24/2021    Could we send this information to you in [employer] or would you prefer to receive a phone call?:   Patient would prefer a phone call   Okay to leave a detailed message?: Yes at Home number on file [phone] (home)

## 2023-01-04 ENCOUNTER — VIRTUAL VISIT (OUTPATIENT)
Dept: FAMILY MEDICINE | Facility: CLINIC | Age: 58
End: 2023-01-04
Payer: COMMERCIAL

## 2023-01-04 DIAGNOSIS — K50.90 CROHN'S DISEASE WITHOUT COMPLICATION, UNSPECIFIED GASTROINTESTINAL TRACT LOCATION (H): ICD-10-CM

## 2023-01-04 DIAGNOSIS — Z12.5 PROSTATE CANCER SCREENING: ICD-10-CM

## 2023-01-04 DIAGNOSIS — D84.9 IMMUNOSUPPRESSION (H): ICD-10-CM

## 2023-01-04 DIAGNOSIS — Z12.11 SCREEN FOR COLON CANCER: ICD-10-CM

## 2023-01-04 DIAGNOSIS — F41.1 ANXIETY STATE: Primary | ICD-10-CM

## 2023-01-04 PROCEDURE — 99214 OFFICE O/P EST MOD 30 MIN: CPT | Mod: 95 | Performed by: FAMILY MEDICINE

## 2023-01-04 RX ORDER — CALCIUM CARBONATE 500(1250)
TABLET,CHEWABLE ORAL
COMMUNITY
Start: 2022-07-20

## 2023-01-04 RX ORDER — PAROXETINE 40 MG/1
40 TABLET, FILM COATED ORAL DAILY
Qty: 90 TABLET | Refills: 3 | Status: SHIPPED | OUTPATIENT
Start: 2023-01-04 | End: 2024-03-25

## 2023-01-04 ASSESSMENT — ENCOUNTER SYMPTOMS: NERVOUS/ANXIOUS: 1

## 2023-01-04 ASSESSMENT — ANXIETY QUESTIONNAIRES
6. BECOMING EASILY ANNOYED OR IRRITABLE: NOT AT ALL
IF YOU CHECKED OFF ANY PROBLEMS ON THIS QUESTIONNAIRE, HOW DIFFICULT HAVE THESE PROBLEMS MADE IT FOR YOU TO DO YOUR WORK, TAKE CARE OF THINGS AT HOME, OR GET ALONG WITH OTHER PEOPLE: NOT DIFFICULT AT ALL
GAD7 TOTAL SCORE: 2
4. TROUBLE RELAXING: SEVERAL DAYS
GAD7 TOTAL SCORE: 2
3. WORRYING TOO MUCH ABOUT DIFFERENT THINGS: NOT AT ALL
7. FEELING AFRAID AS IF SOMETHING AWFUL MIGHT HAPPEN: NOT AT ALL
1. FEELING NERVOUS, ANXIOUS, OR ON EDGE: NOT AT ALL
5. BEING SO RESTLESS THAT IT IS HARD TO SIT STILL: SEVERAL DAYS
2. NOT BEING ABLE TO STOP OR CONTROL WORRYING: NOT AT ALL

## 2023-01-04 ASSESSMENT — PATIENT HEALTH QUESTIONNAIRE - PHQ9: SUM OF ALL RESPONSES TO PHQ QUESTIONS 1-9: 0

## 2023-01-04 NOTE — PROGRESS NOTES
Joe is a 57 year old who is being evaluated via a billable video visit.      How would you like to obtain your AVS? MyChart  If the video visit is dropped, the invitation should be resent by: Text to cell phone: [phone]  Will anyone else be joining your video visit? No  Video-Visit Details    Type of service:  Video Visit     Originating Location (pt. Location): Home  Distant Location (provider location):  On-site  Platform used for Video Visit: [employer]    Problem List Items Addressed This Visit     Anxiety - Primary     Doing very well on current Paxil dosing. Will continue current plan of care and treatment.         Relevant Medications    PARoxetine (PAXIL) 40 MG tablet    Other Relevant Orders    Basic metabolic panel  (Ca, Cl, CO2, Creat, Gluc, K, Na, BUN)    Crohn's Disease     GI notes and labs reviewed. Staying very stable on Remicade. Continue current specialty care.         Immunosuppression (H)     On Remicade.        Other Visit Diagnoses     Screen for colon cancer        Prostate cancer screening        Relevant Orders    PSA, screen         Follow-up Visit   Expected date: Jan 04, 2023      Follow Up Appointment Details:     Follow-up with whom?: Other Primary Care Services    Follow-Up for what?: Lab Visit    Is this an as-needed follow-up?: No              Follow-up Visit   Expected date: Dec 01, 2023      Follow Up Appointment Details:     Follow-up with whom?: Me    Follow-Up for what?: Adult Preventive    Any Additional Chronic Condition Management?: Anxiety    How?: In Person                     Subjective   Joe is a 57 year old who presents for the following health issues   Chief Complaint   Patient presents with     Follow Up     Anxiety      Denies any chest pain, shortness of breath, dyspnea exertion, palpitations, nausea or vomiting.  Denies any changes in vision or hearing, or urinary or bowel habits. Overall feels good. Of note, PSA slightly elevated last year. Due for recheck.  Discussed plan of care if elevated again this year.    Anxiety  This is a chronic problem. The current episode started more than 1 year ago. The problem occurs every several days. The problem has been unchanged. Nothing aggravates the symptoms. Treatments tried: Medication. The treatment provided moderate relief.          Review of Systems   Psychiatric/Behavioral: The patient is nervous/anxious.    All other systems reviewed and are negative.           Objective           Vitals:  No vitals were obtained today due to virtual visit.    Physical Exam  Nursing note reviewed.   Constitutional:       General: He is not in acute distress.     Appearance: Normal appearance. He is not ill-appearing.   HENT:      Head: Normocephalic and atraumatic.   Eyes:      Extraocular Movements: Extraocular movements intact.      Conjunctiva/sclera: Conjunctivae normal.   Pulmonary:      Effort: Pulmonary effort is normal.   Neurological:      Mental Status: He is alert and oriented to person, place, and time.   Psychiatric:         Attention and Perception: Attention normal.         Mood and Affect: Mood normal.         Speech: Speech normal.         Thought Content: Thought content normal.              This note has been dictated using voice recognition software. Any grammatical or context distortions are unintentional and inherent to the software

## 2023-01-11 ENCOUNTER — ALLIED HEALTH/NURSE VISIT (OUTPATIENT)
Dept: FAMILY MEDICINE | Facility: CLINIC | Age: 58
End: 2023-01-11
Payer: COMMERCIAL

## 2023-01-11 ENCOUNTER — LAB (OUTPATIENT)
Dept: LAB | Facility: CLINIC | Age: 58
End: 2023-01-11
Attending: FAMILY MEDICINE
Payer: COMMERCIAL

## 2023-01-11 DIAGNOSIS — F41.1 ANXIETY STATE: ICD-10-CM

## 2023-01-11 DIAGNOSIS — Z23 NEED FOR VACCINATION: Primary | ICD-10-CM

## 2023-01-11 DIAGNOSIS — Z12.5 PROSTATE CANCER SCREENING: ICD-10-CM

## 2023-01-11 PROCEDURE — 90750 HZV VACC RECOMBINANT IM: CPT

## 2023-01-11 PROCEDURE — 36415 COLL VENOUS BLD VENIPUNCTURE: CPT

## 2023-01-11 PROCEDURE — 90471 IMMUNIZATION ADMIN: CPT

## 2023-01-11 PROCEDURE — 99207 PR NO CHARGE NURSE ONLY: CPT

## 2023-01-11 PROCEDURE — 80048 BASIC METABOLIC PNL TOTAL CA: CPT

## 2023-01-11 PROCEDURE — G0103 PSA SCREENING: HCPCS

## 2023-01-12 LAB
ANION GAP SERPL CALCULATED.3IONS-SCNC: 15 MMOL/L (ref 7–15)
BUN SERPL-MCNC: 10.8 MG/DL (ref 6–20)
CALCIUM SERPL-MCNC: 9.6 MG/DL (ref 8.6–10)
CHLORIDE SERPL-SCNC: 104 MMOL/L (ref 98–107)
CREAT SERPL-MCNC: 0.78 MG/DL (ref 0.67–1.17)
DEPRECATED HCO3 PLAS-SCNC: 19 MMOL/L (ref 22–29)
GFR SERPL CREATININE-BSD FRML MDRD: >90 ML/MIN/1.73M2
GLUCOSE SERPL-MCNC: 82 MG/DL (ref 70–99)
POTASSIUM SERPL-SCNC: 4.7 MMOL/L (ref 3.4–5.3)
PSA SERPL-MCNC: 2.15 NG/ML (ref 0–3.5)
SODIUM SERPL-SCNC: 138 MMOL/L (ref 136–145)

## 2023-01-18 ENCOUNTER — TRANSFERRED RECORDS (OUTPATIENT)
Dept: HEALTH INFORMATION MANAGEMENT | Facility: CLINIC | Age: 58
End: 2023-01-18
Payer: COMMERCIAL

## 2023-01-28 ENCOUNTER — HEALTH MAINTENANCE LETTER (OUTPATIENT)
Age: 58
End: 2023-01-28

## 2023-02-15 ENCOUNTER — TRANSFERRED RECORDS (OUTPATIENT)
Dept: HEALTH INFORMATION MANAGEMENT | Facility: CLINIC | Age: 58
End: 2023-02-15
Payer: COMMERCIAL

## 2023-03-15 ENCOUNTER — TRANSFERRED RECORDS (OUTPATIENT)
Dept: HEALTH INFORMATION MANAGEMENT | Facility: CLINIC | Age: 58
End: 2023-03-15
Payer: COMMERCIAL

## 2023-05-10 ENCOUNTER — TRANSFERRED RECORDS (OUTPATIENT)
Dept: HEALTH INFORMATION MANAGEMENT | Facility: CLINIC | Age: 58
End: 2023-05-10
Payer: COMMERCIAL

## 2023-05-10 LAB
ALT SERPL-CCNC: 30 IU/L (ref 0–44)
AST SERPL-CCNC: 40 IU/L (ref 0–40)

## 2023-07-05 ENCOUNTER — TRANSFERRED RECORDS (OUTPATIENT)
Dept: HEALTH INFORMATION MANAGEMENT | Facility: CLINIC | Age: 58
End: 2023-07-05
Payer: COMMERCIAL

## 2023-08-30 ENCOUNTER — TRANSFERRED RECORDS (OUTPATIENT)
Dept: HEALTH INFORMATION MANAGEMENT | Facility: CLINIC | Age: 58
End: 2023-08-30
Payer: COMMERCIAL

## 2023-10-25 ENCOUNTER — TRANSFERRED RECORDS (OUTPATIENT)
Dept: HEALTH INFORMATION MANAGEMENT | Facility: CLINIC | Age: 58
End: 2023-10-25
Payer: COMMERCIAL

## 2023-10-25 LAB
ALT SERPL-CCNC: 32 IU/L (ref 0–44)
AST SERPL-CCNC: 29 IU/L (ref 0–40)

## 2023-11-03 ENCOUNTER — IMMUNIZATION (OUTPATIENT)
Dept: FAMILY MEDICINE | Facility: CLINIC | Age: 58
End: 2023-11-03
Payer: COMMERCIAL

## 2023-11-03 PROCEDURE — 90686 IIV4 VACC NO PRSV 0.5 ML IM: CPT

## 2023-11-03 PROCEDURE — 90471 IMMUNIZATION ADMIN: CPT

## 2023-11-03 PROCEDURE — 91320 SARSCV2 VAC 30MCG TRS-SUC IM: CPT

## 2023-11-03 PROCEDURE — 90480 ADMN SARSCOV2 VAC 1/ONLY CMP: CPT

## 2023-12-20 ENCOUNTER — TRANSFERRED RECORDS (OUTPATIENT)
Dept: HEALTH INFORMATION MANAGEMENT | Facility: CLINIC | Age: 58
End: 2023-12-20
Payer: COMMERCIAL

## 2024-02-14 ENCOUNTER — TRANSFERRED RECORDS (OUTPATIENT)
Dept: HEALTH INFORMATION MANAGEMENT | Facility: CLINIC | Age: 59
End: 2024-02-14
Payer: COMMERCIAL

## 2024-02-25 ENCOUNTER — HEALTH MAINTENANCE LETTER (OUTPATIENT)
Age: 59
End: 2024-02-25

## 2024-03-13 ENCOUNTER — E-CONSULT (OUTPATIENT)
Dept: SLEEP MEDICINE | Facility: CLINIC | Age: 59
End: 2024-03-13
Payer: COMMERCIAL

## 2024-03-13 ENCOUNTER — OFFICE VISIT (OUTPATIENT)
Dept: FAMILY MEDICINE | Facility: CLINIC | Age: 59
End: 2024-03-13
Payer: COMMERCIAL

## 2024-03-13 VITALS
BODY MASS INDEX: 32.17 KG/M2 | TEMPERATURE: 98.1 F | HEART RATE: 88 BPM | SYSTOLIC BLOOD PRESSURE: 143 MMHG | OXYGEN SATURATION: 95 % | HEIGHT: 66 IN | DIASTOLIC BLOOD PRESSURE: 95 MMHG | RESPIRATION RATE: 12 BRPM | WEIGHT: 200.2 LBS

## 2024-03-13 DIAGNOSIS — D84.9 IMMUNOSUPPRESSION (H): ICD-10-CM

## 2024-03-13 DIAGNOSIS — R53.82 CHRONIC FATIGUE: ICD-10-CM

## 2024-03-13 DIAGNOSIS — I10 ESSENTIAL HYPERTENSION: Primary | ICD-10-CM

## 2024-03-13 DIAGNOSIS — R06.83 SNORES: ICD-10-CM

## 2024-03-13 DIAGNOSIS — K50.90 CROHN'S DISEASE WITHOUT COMPLICATION, UNSPECIFIED GASTROINTESTINAL TRACT LOCATION (H): ICD-10-CM

## 2024-03-13 DIAGNOSIS — R06.81 WITNESSED APNEIC SPELLS: ICD-10-CM

## 2024-03-13 DIAGNOSIS — R06.83 SNORING: ICD-10-CM

## 2024-03-13 DIAGNOSIS — R29.818 SUSPECTED SLEEP APNEA: Primary | ICD-10-CM

## 2024-03-13 DIAGNOSIS — H60.391 INFECTIVE OTITIS EXTERNA, RIGHT: ICD-10-CM

## 2024-03-13 LAB
ATRIAL RATE - MUSE: 85 BPM
DIASTOLIC BLOOD PRESSURE - MUSE: NORMAL MMHG
ERYTHROCYTE [DISTWIDTH] IN BLOOD BY AUTOMATED COUNT: 12.3 % (ref 10–15)
HCT VFR BLD AUTO: 45.8 % (ref 40–53)
HGB BLD-MCNC: 15.5 G/DL (ref 13.3–17.7)
INTERPRETATION ECG - MUSE: NORMAL
MCH RBC QN AUTO: 30.9 PG (ref 26.5–33)
MCHC RBC AUTO-ENTMCNC: 33.8 G/DL (ref 31.5–36.5)
MCV RBC AUTO: 91 FL (ref 78–100)
P AXIS - MUSE: 28 DEGREES
PLATELET # BLD AUTO: 178 10E3/UL (ref 150–450)
PR INTERVAL - MUSE: 134 MS
QRS DURATION - MUSE: 90 MS
QT - MUSE: 388 MS
QTC - MUSE: 461 MS
R AXIS - MUSE: -26 DEGREES
RBC # BLD AUTO: 5.02 10E6/UL (ref 4.4–5.9)
SYSTOLIC BLOOD PRESSURE - MUSE: NORMAL MMHG
T AXIS - MUSE: -12 DEGREES
VENTRICULAR RATE- MUSE: 85 BPM
WBC # BLD AUTO: 4.6 10E3/UL (ref 4–11)

## 2024-03-13 PROCEDURE — 80048 BASIC METABOLIC PNL TOTAL CA: CPT | Performed by: FAMILY MEDICINE

## 2024-03-13 PROCEDURE — 93010 ELECTROCARDIOGRAM REPORT: CPT | Performed by: INTERNAL MEDICINE

## 2024-03-13 PROCEDURE — 93005 ELECTROCARDIOGRAM TRACING: CPT | Performed by: FAMILY MEDICINE

## 2024-03-13 PROCEDURE — 99214 OFFICE O/P EST MOD 30 MIN: CPT | Mod: 25 | Performed by: FAMILY MEDICINE

## 2024-03-13 PROCEDURE — 85027 COMPLETE CBC AUTOMATED: CPT | Performed by: FAMILY MEDICINE

## 2024-03-13 PROCEDURE — 84443 ASSAY THYROID STIM HORMONE: CPT | Performed by: FAMILY MEDICINE

## 2024-03-13 PROCEDURE — 36415 COLL VENOUS BLD VENIPUNCTURE: CPT | Performed by: FAMILY MEDICINE

## 2024-03-13 PROCEDURE — 99451 NTRPROF PH1/NTRNET/EHR 5/>: CPT | Performed by: INTERNAL MEDICINE

## 2024-03-13 PROCEDURE — 99207 E-CONSULT TO SLEEP MEDICINE (ADULT OUTPT PROVIDER TO SPECIALIST WRITTEN QUESTION & RESPONSE): CPT | Performed by: FAMILY MEDICINE

## 2024-03-13 RX ORDER — HYDROCORTISONE AND ACETIC ACID 20.75; 10.375 MG/ML; MG/ML
3 SOLUTION AURICULAR (OTIC) 2 TIMES DAILY
Qty: 10 ML | Refills: 0 | Status: SHIPPED | OUTPATIENT
Start: 2024-03-13

## 2024-03-13 RX ORDER — HYDROCHLOROTHIAZIDE 12.5 MG/1
12.5 TABLET ORAL DAILY
Qty: 90 TABLET | Refills: 1 | Status: SHIPPED | OUTPATIENT
Start: 2024-03-13 | End: 2024-04-24

## 2024-03-13 NOTE — PROGRESS NOTES
3/13/2024     E-Consult has been accepted.    Interprofessional consultation requested by:  Monty Morel MD      Clinical Question/Purpose: MY CLINICAL QUESTION IS: BRIAN? 6/8    Patient assessment and information reviewed: 58 years old male, with a BMI of 32, medical comorbidity of hypertension, Crohn's disease, who is referred with complaints of frequent loud snoring, witnessed apneas and daytime fatigue.  There is high pretest probability for obstructive sleep apnea, and an overnight sleep study is recommended for further evaluation.    Recommendations:     Home sleep apnea testing  Instruct patient to contact [place] [facility] at [phone] to schedule home sleep test and a consultation appointment with Dr. Ernesto Ruggiero or an advanced practice provider.      The recommendations provided in this E-Consult are based on a review of clinical data pertinent to the clinical question presented, without a review of the patient's complete medical record or, the benefit of a comprehensive in-person or virtual patient evaluation. This consultation should not replace the clinical judgement and evaluation of the provider ordering this E-Consult. Any new clinical issues, or changes in patient status since the filing of this E-Consult will need to be taken into account when assessing these recommendations. Please contact me if you have further questions.    My total time spent reviewing clinical information and formulating assessment was 5 minutes.        Ernesto Ruggiero MD

## 2024-03-13 NOTE — ASSESSMENT & PLAN NOTE
Ongoing.  Friend recently prescribed him oral ciprofloxacin with temporary improvement.  There is drainage discharge in the right external auditory canal.  It appears that the tympanic membrane is intact.  There have been concerns about dermatitis of the skin in his years in the past.  Acetic acid-hydrocortisone drops prescribed.  Referral back to ENT for evaluation given recurrence and on response to treatments.

## 2024-03-13 NOTE — ASSESSMENT & PLAN NOTE
New diagnosis.  He has had elevated blood pressure measurements in multiple settings.  Will initiate therapy.  Laboratory testing is ordered.  EKG without abnormalities today.  Trial of hydrochlorothiazide 12.5 mg.  Will plan for nurse visit in 2-3 weeks to recheck potassium and check blood pressure.

## 2024-03-13 NOTE — PROGRESS NOTES
Assessment & Plan   Problem List Items Addressed This Visit       Crohn's Disease     Continues infusions.  Blood pressure elevated during these infusions.         Essential hypertension - Primary     New diagnosis.  He has had elevated blood pressure measurements in multiple settings.  Will initiate therapy.  Laboratory testing is ordered.  EKG without abnormalities today.  Trial of hydrochlorothiazide 12.5 mg.  Will plan for nurse visit in 2-3 weeks to recheck potassium and check blood pressure.         Relevant Medications    hydroCHLOROthiazide 12.5 MG tablet    Other Relevant Orders    Basic metabolic panel  (Ca, Cl, CO2, Creat, Gluc, K, Na, BUN)    CBC with platelets (Completed)    TSH with free T4 reflex    EKG 12-lead, tracing only (Completed)    Adult E-Consult to Sleep Medicine (Outpt Provider to Specialist Written Question & Response)    Basic metabolic panel  (Ca, Cl, CO2, Creat, Gluc, K, Na, BUN)    Immunosuppression (H24)     Continues infusions.           Infective otitis externa, right     Ongoing.  Friend recently prescribed him oral ciprofloxacin with temporary improvement.  There is drainage discharge in the right external auditory canal.  It appears that the tympanic membrane is intact.  There have been concerns about dermatitis of the skin in his years in the past.  Acetic acid-hydrocortisone drops prescribed.  Referral back to ENT for evaluation given recurrence and on response to treatments.         Relevant Medications    acetic acid-hydrocortisone (VOSOL-HC) 1-2 % otic solution    Other Relevant Orders    Adult ENT  Referral    Snores     Snoring discussed in the context of new diagnosis of hypertension.  STOP-BANG score 6/8.  E consultation placed.         Relevant Orders    Adult E-Consult to Sleep Medicine (Outpt Provider to Specialist Written Question & Response)     Mitra Diaz is a 58 year old, presenting for the following health issues:  Hypertension (Discuss high  "blood pressure)        3/13/2024    11:22 AM   Additional Questions   Roomed by [facility]     Elevated in various settings including dentist yesterday (160/125).  Walking as exercise.  No chest pain    Ears:   - draining.  Has been on ciprofloxacin in the past.  Was recently on oral ciprofloxacin as prescribed by a friend.  - Has seen ENT most recently in 2021 for similar complaints.  - No pain.  No changes in hearing.  He hears some sense of muffling only when sleeping on his ears.    History of Present Illness       Reason for visit:  Bp  Symptom onset:  More than a month  Symptom intensity:  Mild  Symptom progression:  Staying the same  Had these symptoms before:  No    He eats 0-1 servings of fruits and vegetables daily.He consumes 1 sweetened beverage(s) daily.He exercises with enough effort to increase his heart rate 10 to 19 minutes per day.  He exercises with enough effort to increase his heart rate 4 days per week. He is missing 1 dose(s) of medications per week.          Objective    BP (!) 143/95 (BP Location: Left arm, Patient Position: Sitting, Cuff Size: Adult Regular)   Pulse 88   Temp 98.1  F (36.7  C) (Oral)   Resp 12   Ht 1.676 m (5' 6\")   Wt 90.8 kg (200 lb 3.2 oz)   SpO2 95%   BMI 32.31 kg/m    Body mass index is 32.31 kg/m .  Physical Exam  Nursing note reviewed.   Constitutional:       General: He is not in acute distress.     Appearance: Normal appearance. He is not ill-appearing.   HENT:      Head: Normocephalic and atraumatic.      Right Ear: External ear normal.      Left Ear: Ear canal and external ear normal.      Ears:      Comments: Purulent discharge in the right external auditory canal.  The tympanic membrane itself appears wet in appearance.  It appears to be intact.  The left TM is gray and dull in appearance (effusion?).  The left external auditory canal is without abnormalities.  Eyes:      General: No scleral icterus.     Extraocular Movements: Extraocular movements intact.      " Conjunctiva/sclera: Conjunctivae normal.   Cardiovascular:      Rate and Rhythm: Normal rate and regular rhythm.      Heart sounds: Normal heart sounds. No murmur heard.     No friction rub. No gallop.   Pulmonary:      Effort: Pulmonary effort is normal. No respiratory distress.      Breath sounds: Normal breath sounds. No wheezing or rales.   Musculoskeletal:         General: No swelling. Normal range of motion.   Skin:     General: Skin is warm.      Coloration: Skin is not jaundiced.      Findings: No rash.   Neurological:      General: No focal deficit present.      Mental Status: He is alert and oriented to person, place, and time. Mental status is at baseline.   Psychiatric:         Attention and Perception: Attention normal.         Mood and Affect: Mood normal.         Speech: Speech normal.         Thought Content: Thought content normal.                    Signed Electronically by: Monty Morel MD

## 2024-03-13 NOTE — ASSESSMENT & PLAN NOTE
Snoring discussed in the context of new diagnosis of hypertension.  STOP-BANG score 6/8.  E consultation placed.

## 2024-03-14 LAB
ANION GAP SERPL CALCULATED.3IONS-SCNC: 9 MMOL/L (ref 7–15)
BUN SERPL-MCNC: 11.1 MG/DL (ref 6–20)
CALCIUM SERPL-MCNC: 9.1 MG/DL (ref 8.6–10)
CHLORIDE SERPL-SCNC: 104 MMOL/L (ref 98–107)
CREAT SERPL-MCNC: 0.81 MG/DL (ref 0.67–1.17)
DEPRECATED HCO3 PLAS-SCNC: 25 MMOL/L (ref 22–29)
EGFRCR SERPLBLD CKD-EPI 2021: >90 ML/MIN/1.73M2
GLUCOSE SERPL-MCNC: 86 MG/DL (ref 70–99)
POTASSIUM SERPL-SCNC: 4.6 MMOL/L (ref 3.4–5.3)
SODIUM SERPL-SCNC: 138 MMOL/L (ref 135–145)
TSH SERPL DL<=0.005 MIU/L-ACNC: 2.19 UIU/ML (ref 0.3–4.2)

## 2024-03-25 DIAGNOSIS — F41.1 ANXIETY STATE: ICD-10-CM

## 2024-03-25 RX ORDER — PAROXETINE 40 MG/1
40 TABLET, FILM COATED ORAL DAILY
Qty: 90 TABLET | Refills: 0 | Status: SHIPPED | OUTPATIENT
Start: 2024-03-25 | End: 2024-06-27

## 2024-03-28 ENCOUNTER — ALLIED HEALTH/NURSE VISIT (OUTPATIENT)
Dept: FAMILY MEDICINE | Facility: CLINIC | Age: 59
End: 2024-03-28
Attending: FAMILY MEDICINE
Payer: COMMERCIAL

## 2024-03-28 VITALS — OXYGEN SATURATION: 98 % | HEART RATE: 96 BPM | DIASTOLIC BLOOD PRESSURE: 107 MMHG | SYSTOLIC BLOOD PRESSURE: 155 MMHG

## 2024-03-28 DIAGNOSIS — I10 ESSENTIAL HYPERTENSION: ICD-10-CM

## 2024-03-28 LAB
ANION GAP SERPL CALCULATED.3IONS-SCNC: 11 MMOL/L (ref 7–15)
BUN SERPL-MCNC: 11.5 MG/DL (ref 6–20)
CALCIUM SERPL-MCNC: 9.6 MG/DL (ref 8.6–10)
CHLORIDE SERPL-SCNC: 101 MMOL/L (ref 98–107)
CREAT SERPL-MCNC: 0.78 MG/DL (ref 0.67–1.17)
DEPRECATED HCO3 PLAS-SCNC: 26 MMOL/L (ref 22–29)
EGFRCR SERPLBLD CKD-EPI 2021: >90 ML/MIN/1.73M2
GLUCOSE SERPL-MCNC: 87 MG/DL (ref 70–99)
POTASSIUM SERPL-SCNC: 4.3 MMOL/L (ref 3.4–5.3)
SODIUM SERPL-SCNC: 138 MMOL/L (ref 135–145)

## 2024-03-28 PROCEDURE — 99207 PR NO CHARGE NURSE ONLY: CPT

## 2024-03-28 PROCEDURE — 80048 BASIC METABOLIC PNL TOTAL CA: CPT

## 2024-03-28 NOTE — PROGRESS NOTES
"I met with Joe Pierce at the request of Dr Morel to recheck his blood pressure.  Blood pressure medications on the med list were reviewed with patient.    Patient has taken all medications as per usual regimen: Yes - although, states he did not take his hydrochlorothiazide yet this morning.  Patient reports tolerating them without any issues or concerns: Yes    Vitals:    03/28/24 0953 03/28/24 1004   BP: (!) 158/102 (!) 155/107   BP Location: Left arm Left arm   Patient Position: Sitting Sitting   Cuff Size: Adult Regular Adult Regular   Pulse: 96    SpO2: 98%            After 5 minutes, the patient's blood pressure remained greater than or equal to 140/90.    Is the patient currently having any chest pain? No  Does the patient currently have a headache? No  Does the patient currently have any vision changes? No  Does the patient currently have any nausea? No  Does the patient currently have any abdominal pain? No      The previous encounter was reviewed.  The patient was discharged and the note will be sent to the provider for final review.       Patient reports he recently purchased an electronic BP cuff 2-3 days ago and has been checking his BP a couple times a day.  States BP has been running high at home, some readings as high as 180/120, with majority of readings in the high 140's-150's systolic, and mid to high 90's to 120 diastolic.     As noted above, patient has not yet taken his BP medication this morning (hydrochlorothiazide 12.5 mg).  Patient had BMP drawn today.  RN instructed pt to take his BP medication ASAP today and will review route encounter to provider for review/recommendations, and follow up with patient.        Per last OV note 3/13/24:    \"Essential hypertension - Primary        New diagnosis.  He has had elevated blood pressure measurements in multiple settings.  Will initiate therapy.  Laboratory testing is ordered.  EKG without abnormalities today.  Trial of hydrochlorothiazide " "12.5 mg.  Will plan for nurse visit in 2-3 weeks to recheck potassium and check blood pressure.\"            Blanca Saez RN  [facility]     "

## 2024-03-29 ENCOUNTER — MYC MEDICAL ADVICE (OUTPATIENT)
Dept: FAMILY MEDICINE | Facility: CLINIC | Age: 59
End: 2024-03-29
Payer: COMMERCIAL

## 2024-03-29 RX ORDER — LOSARTAN POTASSIUM 50 MG/1
50 TABLET ORAL DAILY
Qty: 90 TABLET | Refills: 1 | Status: SHIPPED | OUTPATIENT
Start: 2024-03-29 | End: 2024-04-13

## 2024-04-10 ENCOUNTER — TRANSFERRED RECORDS (OUTPATIENT)
Dept: HEALTH INFORMATION MANAGEMENT | Facility: CLINIC | Age: 59
End: 2024-04-10
Payer: COMMERCIAL

## 2024-04-10 LAB
ALT SERPL-CCNC: 49 IU/L (ref 0–44)
AST SERPL-CCNC: 49 IU/L (ref 0–40)

## 2024-04-11 ENCOUNTER — ALLIED HEALTH/NURSE VISIT (OUTPATIENT)
Dept: FAMILY MEDICINE | Facility: CLINIC | Age: 59
End: 2024-04-11
Payer: COMMERCIAL

## 2024-04-11 VITALS — SYSTOLIC BLOOD PRESSURE: 147 MMHG | DIASTOLIC BLOOD PRESSURE: 99 MMHG | HEART RATE: 89 BPM

## 2024-04-11 DIAGNOSIS — I10 ESSENTIAL HYPERTENSION: Primary | ICD-10-CM

## 2024-04-11 PROCEDURE — 99207 PR NO CHARGE NURSE ONLY: CPT

## 2024-04-11 ASSESSMENT — PAIN SCALES - GENERAL: PAINLEVEL: NO PAIN (0)

## 2024-04-11 NOTE — PROGRESS NOTES
"I met with Joe Pierce at the request of [facility] to recheck his blood pressure.  Blood pressure medications on the med list were reviewed with patient.    Patient has taken all medications as per usual regimen: Yes  Patient reports tolerating them without any issues or concerns: Yes    Vitals:    04/11/24 1101 04/11/24 1112   BP: (!) 151/101 (!) 147/99   BP Location: Left arm Left arm   Patient Position: Sitting Sitting   Cuff Size: Adult Large Adult Large   Pulse: 96 89       After 5 minutes, the patient's blood pressure remained greater than or equal to 140/90.    Is the patient currently having any chest pain? No  Does the patient currently have a headache? No  Does the patient currently have any vision changes? No  Does the patient currently have any nausea? No  Does the patient currently have any abdominal pain? No    The previous encounter was reviewed.  The patient was discharged and the note will be sent to the provider for final review.     Noted from 3/28/24 Nurse Visit: Please assist patient in scheduling a nurse only visit for BP check per Dr. Morel message, \"We should plan on rechecking in about 2 weeks.  A nurse visit would suffice for the next blood pressure measurement.  Please take your medicines as you normally would on the day of the blood pressure measurement.\"   "

## 2024-04-13 RX ORDER — LOSARTAN POTASSIUM 100 MG/1
100 TABLET ORAL DAILY
Qty: 90 TABLET | Refills: 1 | Status: SHIPPED | OUTPATIENT
Start: 2024-04-13 | End: 2024-04-24

## 2024-04-22 ENCOUNTER — HOSPITAL ENCOUNTER (OUTPATIENT)
Dept: ULTRASOUND IMAGING | Facility: CLINIC | Age: 59
Discharge: HOME | End: 2024-04-22
Attending: INTERNAL MEDICINE | Admitting: INTERNAL MEDICINE
Payer: COMMERCIAL

## 2024-04-22 DIAGNOSIS — R74.8 ELEVATED LIVER ENZYMES: ICD-10-CM

## 2024-04-22 PROCEDURE — 76705 ECHO EXAM OF ABDOMEN: CPT

## 2024-04-24 ENCOUNTER — OFFICE VISIT (OUTPATIENT)
Dept: FAMILY MEDICINE | Facility: CLINIC | Age: 59
End: 2024-04-24
Payer: COMMERCIAL

## 2024-04-24 VITALS
TEMPERATURE: 98.5 F | DIASTOLIC BLOOD PRESSURE: 79 MMHG | SYSTOLIC BLOOD PRESSURE: 125 MMHG | OXYGEN SATURATION: 97 % | BODY MASS INDEX: 32.42 KG/M2 | HEIGHT: 66 IN | WEIGHT: 201.7 LBS | RESPIRATION RATE: 12 BRPM | HEART RATE: 94 BPM

## 2024-04-24 DIAGNOSIS — Z13.220 SCREENING FOR LIPID DISORDERS: ICD-10-CM

## 2024-04-24 DIAGNOSIS — I10 ESSENTIAL HYPERTENSION: Primary | ICD-10-CM

## 2024-04-24 DIAGNOSIS — Z13.1 SCREENING FOR DIABETES MELLITUS: ICD-10-CM

## 2024-04-24 DIAGNOSIS — K76.0 NAFLD (NONALCOHOLIC FATTY LIVER DISEASE): ICD-10-CM

## 2024-04-24 DIAGNOSIS — Z12.5 SCREENING FOR PROSTATE CANCER: ICD-10-CM

## 2024-04-24 PROCEDURE — 84450 TRANSFERASE (AST) (SGOT): CPT | Performed by: FAMILY MEDICINE

## 2024-04-24 PROCEDURE — 80048 BASIC METABOLIC PNL TOTAL CA: CPT | Performed by: FAMILY MEDICINE

## 2024-04-24 PROCEDURE — 36415 COLL VENOUS BLD VENIPUNCTURE: CPT | Performed by: FAMILY MEDICINE

## 2024-04-24 PROCEDURE — 84460 ALANINE AMINO (ALT) (SGPT): CPT | Performed by: FAMILY MEDICINE

## 2024-04-24 PROCEDURE — G0103 PSA SCREENING: HCPCS | Performed by: FAMILY MEDICINE

## 2024-04-24 PROCEDURE — 99214 OFFICE O/P EST MOD 30 MIN: CPT | Performed by: FAMILY MEDICINE

## 2024-04-24 PROCEDURE — 80061 LIPID PANEL: CPT | Performed by: FAMILY MEDICINE

## 2024-04-24 RX ORDER — HYDROCHLOROTHIAZIDE 12.5 MG/1
12.5 TABLET ORAL DAILY
Qty: 90 TABLET | Refills: 1 | Status: SHIPPED | OUTPATIENT
Start: 2024-04-24

## 2024-04-24 RX ORDER — LOSARTAN POTASSIUM 100 MG/1
100 TABLET ORAL DAILY
Qty: 90 TABLET | Refills: 1 | Status: SHIPPED | OUTPATIENT
Start: 2024-04-24

## 2024-04-24 NOTE — PROGRESS NOTES
Assessment & Plan   Problem List Items Addressed This Visit       Essential hypertension - Primary     This patient returns for review of blood pressure.  When I last saw him we diagnosed him with essential hypertension.  He has had a couple of measures and we titrated his medicines accordingly.  Currently he feels like he is doing well.  His blood pressure is within normal limits.  No symptoms of hypotension or other side effects.   - Given evidence of metabolic syndrome/NAFLD I recommended a high-protein, high vegetable/fruit diet well trying to minimize processed foods.  We also talked about physical activity.  We discussed this is a means by which to start to work to get off of his antihypertensive medications.  - Continue hydrochlorothiazide 12.5 mg/day.  - Continue losartan 100 mg/day.  - Check basic metabolic panel.  We reviewed overall metabolic health.  He has not had fasting lab work in a number of years.  This will be also be completed as part of today's evaluation.  He had an ultrasound of his abdomen recently for another health concern which showed hepatic steatosis.  Will check liver labs.  He likely has NAFLD.  From this perspective he does meet criteria for medical weight loss with GLP-1 receptor agonist with his employer.  He had not thought of this as an option but this was briefly discussed and he will think about it.         Relevant Medications    hydroCHLOROthiazide 12.5 MG tablet    losartan (COZAAR) 100 MG tablet    Other Relevant Orders    Basic metabolic panel  (Ca, Cl, CO2, Creat, Gluc, K, Na, BUN)    ALT    AST    NAFLD (nonalcoholic fatty liver disease)     Other Visit Diagnoses       Screening for lipid disorders        Relevant Orders    Lipid panel reflex to direct LDL Fasting    Screening for diabetes mellitus        Relevant Orders    Basic metabolic panel  (Ca, Cl, CO2, Creat, Gluc, K, Na, BUN)    Screening for prostate cancer        Relevant Orders    PSA, screen            "  BMI  Estimated body mass index is 32.56 kg/m  as calculated from the following:    Height as of this encounter: 1.676 m (5' 6\").    Weight as of this encounter: 91.5 kg (201 lb 11.2 oz).   Weight management plan: Discussed healthy diet and exercise guidelines    Mitra Diaz is a 58 year old, presenting for the following health issues:  Hypertension (Follow-up )        4/24/2024    10:27 AM   Additional Questions   Roomed by chinedu     History of Present Illness       Hypertension: He presents for follow up of hypertension.  He does check blood pressure  regularly outside of the clinic. Outside blood pressures have been over 140/90. He does not follow a low salt diet.     He eats 0-1 servings of fruits and vegetables daily.He consumes 1 sweetened beverage(s) daily.He exercises with enough effort to increase his heart rate 10 to 19 minutes per day.  He exercises with enough effort to increase his heart rate 3 or less days per week.   He is taking medications regularly.          Objective    /79 (BP Location: Left arm, Patient Position: Sitting, Cuff Size: Adult Large)   Pulse 94   Temp 98.5  F (36.9  C) (Oral)   Resp 12   Ht 1.676 m (5' 6\")   Wt 91.5 kg (201 lb 11.2 oz)   SpO2 97%   BMI 32.56 kg/m    Body mass index is 32.56 kg/m .  Physical Exam  Nursing note reviewed.   Constitutional:       General: He is not in acute distress.     Appearance: Normal appearance. He is not ill-appearing.   HENT:      Head: Normocephalic and atraumatic.      Right Ear: External ear normal.      Left Ear: External ear normal.   Eyes:      General: No scleral icterus.     Extraocular Movements: Extraocular movements intact.      Conjunctiva/sclera: Conjunctivae normal.   Cardiovascular:      Rate and Rhythm: Normal rate and regular rhythm.      Heart sounds: Normal heart sounds. No murmur heard.     No friction rub. No gallop.   Pulmonary:      Effort: Pulmonary effort is normal. No respiratory distress.      Breath " sounds: Normal breath sounds. No wheezing or rales.   Musculoskeletal:         General: No swelling. Normal range of motion.   Skin:     General: Skin is warm.      Coloration: Skin is not jaundiced.      Findings: No rash.   Neurological:      General: No focal deficit present.      Mental Status: He is alert and oriented to person, place, and time. Mental status is at baseline.   Psychiatric:         Attention and Perception: Attention normal.         Mood and Affect: Mood normal.         Speech: Speech normal.         Thought Content: Thought content normal.                Signed Electronically by: Monty Morel MD

## 2024-04-24 NOTE — ASSESSMENT & PLAN NOTE
This patient returns for review of blood pressure.  When I last saw him we diagnosed him with essential hypertension.  He has had a couple of measures and we titrated his medicines accordingly.  Currently he feels like he is doing well.  His blood pressure is within normal limits.  No symptoms of hypotension or other side effects.   - Given evidence of metabolic syndrome/NAFLD I recommended a high-protein, high vegetable/fruit diet well trying to minimize processed foods.  We also talked about physical activity.  We discussed this is a means by which to start to work to get off of his antihypertensive medications.  - Continue hydrochlorothiazide 12.5 mg/day.  - Continue losartan 100 mg/day.  - Check basic metabolic panel.  We reviewed overall metabolic health.  He has not had fasting lab work in a number of years.  This will be also be completed as part of today's evaluation.  He had an ultrasound of his abdomen recently for another health concern which showed hepatic steatosis.  Will check liver labs.  He likely has NAFLD.  From this perspective he does meet criteria for medical weight loss with GLP-1 receptor agonist with his employer.  He had not thought of this as an option but this was briefly discussed and he will think about it.

## 2024-04-25 ENCOUNTER — MEDICAL CORRESPONDENCE (OUTPATIENT)
Dept: HEALTH INFORMATION MANAGEMENT | Facility: CLINIC | Age: 59
End: 2024-04-25
Payer: COMMERCIAL

## 2024-04-25 ENCOUNTER — TRANSFERRED RECORDS (OUTPATIENT)
Dept: HEALTH INFORMATION MANAGEMENT | Facility: CLINIC | Age: 59
End: 2024-04-25
Payer: COMMERCIAL

## 2024-04-25 LAB
ALT SERPL W P-5'-P-CCNC: 45 U/L (ref 0–70)
ANION GAP SERPL CALCULATED.3IONS-SCNC: 13 MMOL/L (ref 7–15)
AST SERPL W P-5'-P-CCNC: 39 U/L (ref 0–45)
BUN SERPL-MCNC: 10.3 MG/DL (ref 6–20)
CALCIUM SERPL-MCNC: 9.1 MG/DL (ref 8.6–10)
CHLORIDE SERPL-SCNC: 102 MMOL/L (ref 98–107)
CHOLEST SERPL-MCNC: 218 MG/DL
CREAT SERPL-MCNC: 0.81 MG/DL (ref 0.67–1.17)
DEPRECATED HCO3 PLAS-SCNC: 23 MMOL/L (ref 22–29)
EGFRCR SERPLBLD CKD-EPI 2021: >90 ML/MIN/1.73M2
FASTING STATUS PATIENT QL REPORTED: YES
GLUCOSE SERPL-MCNC: 90 MG/DL (ref 70–99)
HDLC SERPL-MCNC: 91 MG/DL
LDLC SERPL CALC-MCNC: 115 MG/DL
NONHDLC SERPL-MCNC: 127 MG/DL
POTASSIUM SERPL-SCNC: 4.3 MMOL/L (ref 3.4–5.3)
PSA SERPL DL<=0.01 NG/ML-MCNC: 2.28 NG/ML (ref 0–3.5)
SODIUM SERPL-SCNC: 138 MMOL/L (ref 135–145)
TRIGL SERPL-MCNC: 60 MG/DL

## 2024-04-29 ENCOUNTER — TRANSCRIBE ORDERS (OUTPATIENT)
Dept: OTHER | Age: 59
End: 2024-04-29

## 2024-04-29 DIAGNOSIS — K82.8 THICKENING OF WALL OF GALLBLADDER: Primary | ICD-10-CM

## 2024-04-29 DIAGNOSIS — K76.0 HEPATIC STEATOSIS: ICD-10-CM

## 2024-04-30 ENCOUNTER — TELEPHONE (OUTPATIENT)
Dept: FAMILY MEDICINE | Facility: CLINIC | Age: 59
End: 2024-04-30
Payer: COMMERCIAL

## 2024-04-30 DIAGNOSIS — K80.10 CALCULUS OF GALLBLADDER WITH CHRONIC CHOLECYSTITIS WITHOUT OBSTRUCTION: Primary | ICD-10-CM

## 2024-04-30 NOTE — TELEPHONE ENCOUNTER
Problem List Items Addressed This Visit       Calculus of gallbladder with chronic cholecystitis without obstruction - Primary     Called and discussed findings with patient.  He said his GI doctor already set him up with a surgeon and he will call and discuss further.  Discussed with patient that this is can unique and the fact that he not only has gallbladder stones but I he showing chronic inflammation inside the gallbladder leading to changes.

## 2024-04-30 NOTE — ASSESSMENT & PLAN NOTE
Called and discussed findings with patient.  He said his GI doctor already set him up with a surgeon and he will call and discuss further.  Discussed with patient that this is can unique and the fact that he not only has gallbladder stones but I he showing chronic inflammation inside the gallbladder leading to changes.

## 2024-05-07 ENCOUNTER — OFFICE VISIT (OUTPATIENT)
Dept: SURGERY | Facility: CLINIC | Age: 59
End: 2024-05-07
Attending: INTERNAL MEDICINE
Payer: COMMERCIAL

## 2024-05-07 VITALS — SYSTOLIC BLOOD PRESSURE: 138 MMHG | DIASTOLIC BLOOD PRESSURE: 70 MMHG | BODY MASS INDEX: 32.28 KG/M2 | WEIGHT: 200 LBS

## 2024-05-07 DIAGNOSIS — K76.0 HEPATIC STEATOSIS: ICD-10-CM

## 2024-05-07 DIAGNOSIS — K82.8 THICKENING OF WALL OF GALLBLADDER: ICD-10-CM

## 2024-05-07 DIAGNOSIS — K81.1 CHRONIC CHOLECYSTITIS: Primary | ICD-10-CM

## 2024-05-07 PROCEDURE — 99204 OFFICE O/P NEW MOD 45 MIN: CPT | Performed by: SURGERY

## 2024-05-07 RX ORDER — CEFAZOLIN SODIUM/WATER 2 G/20 ML
2 SYRINGE (ML) INTRAVENOUS
Status: CANCELLED | OUTPATIENT
Start: 2024-06-20

## 2024-05-07 RX ORDER — CEFAZOLIN SODIUM/WATER 2 G/20 ML
2 SYRINGE (ML) INTRAVENOUS SEE ADMIN INSTRUCTIONS
Status: CANCELLED | OUTPATIENT
Start: 2024-06-20

## 2024-05-07 RX ORDER — INDOCYANINE GREEN AND WATER 25 MG
2.5 KIT INJECTION ONCE
Status: CANCELLED | OUTPATIENT
Start: 2024-05-07 | End: 2024-05-07

## 2024-05-07 NOTE — PROGRESS NOTES
General Surgery H&P  Joe Pierce MRN# [id number]   Age/Sex: 58 year old male YOB: 1965     Reason for visit: Gallstones       Referring physician: Dr. Pham                   Assessment and Plan:   Assessment:  1.  Cholelithiasis  2.  History of Crohn's  3.  History of chronic cholecystitis    58-year-old male presenting to the general surgery clinic today for history of gallstones.  We did discuss potential problems such as cholecystitis and potential gallbladder cancer with findings of large gallstones in the future.  In addition, the ultrasound does show some mild wall thickening of the gallbladder which I do suspect is chronic cholecystitis from the presence of the large gallstones.  Currently patient is asymptomatic.    Plan:  -Given the findings of the cholelithiasis, my recommendation would be to proceed with a cholecystectomy robotically.  Patient does want to pursue surgical intervention however he will call back for scheduling.  -Patient will require medical clearance as well as possible clearance from GI team if needed for the history of Crohn's.  -The risks and benefits of the procedure were explained detail to the patient. The risks include infection, bleeding, damage to the surrounding structures. Patient verbalized understanding provided consent to undergo the procedure above.            Chief Complaint:     Chief Complaint   Patient presents with    Consult     Thickening of gallbladder- US @ [facility] 4/22        History is obtained from the patient    HPI:   Joe Pierce is a 58 year old male who presents general surgery team today for evaluation regarding findings of gallstones.  Patient states that he had an ultrasound completed by his GI doctor.  Findings did show that the patient had large gallstones.  Patient states that he has been asymptomatic.  Did not know that he had cholelithiasis.  Patient does have a history of Crohn's disease.  Patient was taking Remicade but it is now  switched over.  Patient has no further complaints at this time.  Tolerating diet.  No nausea no vomiting.          Past Medical History:     Past Medical History:   Diagnosis Date    Anxiety disorder     Crohn disease (H)     Crohn's disease (H)     Immunosuppression (H24)     Seasonal allergies               Past Surgical History:     Past Surgical History:   Procedure Laterality Date    APPENDECTOMY      CATARACT IOL, RT/LT      TONSILLECTOMY               Social History:    reports that he has never smoked. He has never been exposed to tobacco smoke. His smokeless tobacco use includes chew. He reports current alcohol use of about 9.0 standard drinks of alcohol per week. He reports that he does not use drugs.           Family History:     Family History   Problem Relation Age of Onset    Ovarian Cancer Mother     Breast Cancer Mother     Coronary Artery Disease Father     No Known Problems Maternal Grandmother     No Known Problems Maternal Grandfather     No Known Problems Paternal Grandmother     No Known Problems Paternal Grandfather     No Known Problems Brother     No Known Problems Brother               Allergies:   No Known Allergies           Medications:     Prior to Admission medications    Medication Sig Start Date End Date Taking? Authorizing Provider   acetic acid-hydrocortisone (VOSOL-HC) 1-2 % otic solution Place 3 drops into the right ear 2 times daily 3/13/24  Yes Monty Morel MD   calcium carbonate 500 mg, elemental, 1250 (500 Ca) MG tablet chewable  7/20/22  Yes Reported, Patient   clobetasol (TEMOVATE) 0.05 % external solution APPLY TOPICALLY TO THE AFFECTED AREA AS NEEDED FOR ITCHING 1/5/21  Yes Reported, Patient   fexofenadine (ALLEGRA) 180 MG tablet [FEXOFENADINE (ALLEGRA) 180 MG TABLET] Take 180 mg by mouth as needed. 5/4/15  Yes Provider, Historical   fish oil-omega-3 fatty acids 1000 MG capsule Take 2 g by mouth daily   Yes Reported, Patient   fluocinonide (LIDEX) 0.05 % cream  [FLUOCINONIDE (LIDEX) 0.05 % CREAM] Apply sparingly to affected ear 3x weekly for itching 6/10/21  Yes Clarence eVntura MD   hydroCHLOROthiazide 12.5 MG tablet Take 1 tablet (12.5 mg) by mouth daily 4/24/24  Yes Monty Morel MD   hydrocortisone (WESTCORT) 0.2 % cream [HYDROCORTISONE (WESTCORT) 0.2 % CREAM] Apply to affected area bid 4/11/17  Yes Yudi Gibbs MD   inFLIXimab (REMICADE) 100 mg injection [INFLIXIMAB (REMICADE) 100 MG INJECTION] Infuse into a venous catheter. Every 8 weeks 5/4/15  Yes Provider, Historical   losartan (COZAAR) 100 MG tablet Take 1 tablet (100 mg) by mouth daily 4/24/24  Yes Monty Morel MD   Multiple Vitamins-Minerals (MULTIVITAMIN ADULTS 50+) TABS Take 1 tablet by mouth daily   Yes Reported, Patient   PARoxetine (PAXIL) 40 MG tablet TAKE ONE TABLET BY MOUTH ONCE DAILY 3/25/24  Yes Restad, Quentin HALE DO   vitamin E (TOCOPHEROL) 100 units (45 mg) capsule take 1 capsule by oral route 2 times every day 7/20/22  Yes Reported, Patient              Review of Systems:   A 12 point Review of Systems is negative other than noted in the HPI            Physical Exam:   Patient Vitals for the past 24 hrs:   BP Weight   05/07/24 1315 138/70 90.7 kg (200 lb)        [unfilled]   Constitutional:   awake, alert, cooperative, no apparent distress, and appears stated age       Eyes:   PERRL, conjunctiva/corneas clear, EOM's intact; no scleral edema or icterus noted        ENT:   Normocephalic, without obvious abnormality, atraumatic, Lips, mucosa, and tongue normal        Hematologic / Lymphatic:   No lymphadenopathy       Lungs:   Normal respiratory effort, no accessory muscle use       Cardiovascular:   Regular rate and rhythm       Abdomen:   Soft, nondistended, nontender to palpation       Musculoskeletal:   No obvious swelling, bruising or deformity       Skin:   Skin color and texture normal for patient, no rashes or lesions              Data:         All imaging studies  reviewed by me. I reviewed the images, and I agree with findings of large gallstones on ultrasound      DO Niko Inman DO  General Surgeon  [facility]  Surgery [facility] - [address] [address]?  Office: [phone]  Employed by - [facility] [employer]  Pager: [phone]

## 2024-05-07 NOTE — LETTER
5/7/2024         RE: Joe Pierce  [address]  [place] [address]        Dear Colleague,    Thank you for referring your patient, Joe Pierce, to the [facility] AND BARIATRICS [facility]. Please see a copy of my visit note below.    General Surgery H&P  Joe Pierce MRN# [id number]   Age/Sex: 58 year old male YOB: 1965     Reason for visit: Gallstones       Referring physician: Dr. Pham                   Assessment and Plan:   Assessment:  1.  Cholelithiasis  2.  History of Crohn's  3.  History of chronic cholecystitis    58-year-old male presenting to the general surgery clinic today for history of gallstones.  We did discuss potential problems such as cholecystitis and potential gallbladder cancer with findings of large gallstones in the future.  In addition, the ultrasound does show some mild wall thickening of the gallbladder which I do suspect is chronic cholecystitis from the presence of the large gallstones.  Currently patient is asymptomatic.    Plan:  -Given the findings of the cholelithiasis, my recommendation would be to proceed with a cholecystectomy robotically.  Patient does want to pursue surgical intervention however he will call back for scheduling.  -Patient will require medical clearance as well as possible clearance from GI team if needed for the history of Crohn's.  -The risks and benefits of the procedure were explained detail to the patient. The risks include infection, bleeding, damage to the surrounding structures. Patient verbalized understanding provided consent to undergo the procedure above.            Chief Complaint:     Chief Complaint   Patient presents with     Consult     Thickening of gallbladder- US @ [facility] 4/22        History is obtained from the patient    HPI:   Joe Pierce is a 58 year old male who presents general surgery team today for evaluation regarding findings of gallstones.  Patient states that he had  an ultrasound completed by his GI doctor.  Findings did show that the patient had large gallstones.  Patient states that he has been asymptomatic.  Did not know that he had cholelithiasis.  Patient does have a history of Crohn's disease.  Patient was taking Remicade but it is now switched over.  Patient has no further complaints at this time.  Tolerating diet.  No nausea no vomiting.          Past Medical History:     Past Medical History:   Diagnosis Date     Anxiety disorder      Crohn disease (H)      Crohn's disease (H)      Immunosuppression (H24)      Seasonal allergies               Past Surgical History:     Past Surgical History:   Procedure Laterality Date     APPENDECTOMY       CATARACT IOL, RT/LT       TONSILLECTOMY               Social History:    reports that he has never smoked. He has never been exposed to tobacco smoke. His smokeless tobacco use includes chew. He reports current alcohol use of about 9.0 standard drinks of alcohol per week. He reports that he does not use drugs.           Family History:     Family History   Problem Relation Age of Onset     Ovarian Cancer Mother      Breast Cancer Mother      Coronary Artery Disease Father      No Known Problems Maternal Grandmother      No Known Problems Maternal Grandfather      No Known Problems Paternal Grandmother      No Known Problems Paternal Grandfather      No Known Problems Brother      No Known Problems Brother               Allergies:   No Known Allergies           Medications:     Prior to Admission medications    Medication Sig Start Date End Date Taking? Authorizing Provider   acetic acid-hydrocortisone (VOSOL-HC) 1-2 % otic solution Place 3 drops into the right ear 2 times daily 3/13/24  Yes Monty Morel MD   calcium carbonate 500 mg, elemental, 1250 (500 Ca) MG tablet chewable  7/20/22  Yes Reported, Patient   clobetasol (TEMOVATE) 0.05 % external solution APPLY TOPICALLY TO THE AFFECTED AREA AS NEEDED FOR ITCHING 1/5/21  Yes  Reported, Patient   fexofenadine (ALLEGRA) 180 MG tablet [FEXOFENADINE (ALLEGRA) 180 MG TABLET] Take 180 mg by mouth as needed. 5/4/15  Yes Provider, Historical   fish oil-omega-3 fatty acids 1000 MG capsule Take 2 g by mouth daily   Yes Reported, Patient   fluocinonide (LIDEX) 0.05 % cream [FLUOCINONIDE (LIDEX) 0.05 % CREAM] Apply sparingly to affected ear 3x weekly for itching 6/10/21  Yes Clarence Ventura MD   hydroCHLOROthiazide 12.5 MG tablet Take 1 tablet (12.5 mg) by mouth daily 4/24/24  Yes Monty Morel MD   hydrocortisone (WESTCORT) 0.2 % cream [HYDROCORTISONE (WESTCORT) 0.2 % CREAM] Apply to affected area bid 4/11/17  Yes Yudi Gibbs MD   inFLIXimab (REMICADE) 100 mg injection [INFLIXIMAB (REMICADE) 100 MG INJECTION] Infuse into a venous catheter. Every 8 weeks 5/4/15  Yes Provider, Historical   losartan (COZAAR) 100 MG tablet Take 1 tablet (100 mg) by mouth daily 4/24/24  Yes Monty Morel MD   Multiple Vitamins-Minerals (MULTIVITAMIN ADULTS 50+) TABS Take 1 tablet by mouth daily   Yes Reported, Patient   PARoxetine (PAXIL) 40 MG tablet TAKE ONE TABLET BY MOUTH ONCE DAILY 3/25/24  Yes Quinten, Quentin HALE DO   vitamin E (TOCOPHEROL) 100 units (45 mg) capsule take 1 capsule by oral route 2 times every day 7/20/22  Yes Reported, Patient              Review of Systems:   A 12 point Review of Systems is negative other than noted in the HPI            Physical Exam:   Patient Vitals for the past 24 hrs:   BP Weight   05/07/24 1315 138/70 90.7 kg (200 lb)        [unfilled]   Constitutional:   awake, alert, cooperative, no apparent distress, and appears stated age       Eyes:   PERRL, conjunctiva/corneas clear, EOM's intact; no scleral edema or icterus noted        ENT:   Normocephalic, without obvious abnormality, atraumatic, Lips, mucosa, and tongue normal        Hematologic / Lymphatic:   No lymphadenopathy       Lungs:   Normal respiratory effort, no accessory muscle use        Cardiovascular:   Regular rate and rhythm       Abdomen:   Soft, nondistended, nontender to palpation       Musculoskeletal:   No obvious swelling, bruising or deformity       Skin:   Skin color and texture normal for patient, no rashes or lesions              Data:         All imaging studies reviewed by me. I reviewed the images, and I agree with findings of large gallstones on ultrasound      DO Niko Inman DO  General Surgeon  [facility]  Surgery [address] [address]?  Office: [phone]  Employed by - [employer]  Pager: [phone]         Again, thank you for allowing me to participate in the care of your patient.        Sincerely,        Niko Penn DO

## 2024-05-08 ENCOUNTER — TELEPHONE (OUTPATIENT)
Dept: SURGERY | Facility: CLINIC | Age: 59
End: 2024-05-08
Payer: COMMERCIAL

## 2024-05-08 NOTE — LETTER
Pre-op Physical: Schedule a pre-op physical with your primary care doctor.  The pre-op physical must be 10-30 days before surgery and since it is required by anesthesia, your surgery will be cancelled if it's not done.     Surgery Date: 6/20/2024     Location: [place] [address]    Approximate Arrival Time: 8:00am  (Unless instructed differently by the pre-op call nurse)     Post op Appointment: Will get call from RN about 1 week post op.    Pre-Surgical Tasks:     Review all medications with your primary care or prescribing physician; they will advise you which meds to stop and when, and when you can resume taking.  Certain medications like blood thinners and weight loss medications need to be stopped in advance of surgery to proceed safely.      Blood thinners including but not exclusive to drugs like Xarelto, Eliquis, Warfarin and Aspirin, should be stopped five days before surgery, if your prescribing provider agrees. Follow your provider's advice on stopping blood thinners because they know you best.  If you are unsure if your medication is a blood thinner, ask your prescribing provider.    Weight loss medications: There are multiple medications being used for weight management and diabetes today, and the list is growing.  Phentermine, Ozempic, Wegovy, Trulicity, and other similar medications need to be stopped one week before surgery to avoid being cancelled.  Victoza and Saxenda can be continued longer but must be stopped one full day before surgery.  Please ask your prescribing provider for advice.    Diabetic medications: in addition to the medications talked about above that are used for either weight loss or diabetes, some people are on insulin that may require adjustment.  Please discuss managing diabetic medications with your prescribing doctor as these medications may require modification prior to surgery.     Please shower the evening before  and morning of surgery with Hibiclens soap.  Any [facility] can provide this to you at no cost, or it can be found at your local pharmacy.     Fasting instructions will be provided by the pre-op nurse who will call you 1-3 days before surgery.  Typically, we advise normal food up to 8 hours before you arrive for surgery. Clear liquids only from then until 2 hours before you arrive surgery, then nothing at all by mouth.  The nurse will review your specific instructions with you at the call.      Smoking impacts your body's ability to heal properly so we advise patients to quit if possible before surgery.  Plastic Surgery patients are required to be nicotine free for at least 8 weeks before surgery.      You will need an adult to drive you home and stay with you 24 hours after surgery. Public transportation or Medical Van Services are not permitted.    Visitor restrictions are subject to change, please verify with the pre-op nurse when they call how many people are permitted to accompany you.    We always encourage you to notify your insurance any time you have medical tests or procedures scheduled including surgery. The number is usually right on the back of your insurance card. To obtain pricing for surgery, please call [facility] Cost of Care at [phone] or email [email or web address].        Call our office if you have any questions! Thank you!     Bill Richardson  Complex   [facility]  [phone]

## 2024-05-10 NOTE — TELEPHONE ENCOUNTER
Spoke with patient today regarding surgery scheduling      Went over details/instructions.    Surgery Letter sent via ensembli  (Please see LETTERS TAB in chart to retrieve a copy of this letter)

## 2024-05-16 ENCOUNTER — TRANSFERRED RECORDS (OUTPATIENT)
Dept: HEALTH INFORMATION MANAGEMENT | Facility: CLINIC | Age: 59
End: 2024-05-16
Payer: COMMERCIAL

## 2024-05-29 ENCOUNTER — TRANSFERRED RECORDS (OUTPATIENT)
Dept: HEALTH INFORMATION MANAGEMENT | Facility: CLINIC | Age: 59
End: 2024-05-29
Payer: COMMERCIAL

## 2024-06-05 ENCOUNTER — TRANSFERRED RECORDS (OUTPATIENT)
Dept: HEALTH INFORMATION MANAGEMENT | Facility: CLINIC | Age: 59
End: 2024-06-05
Payer: COMMERCIAL

## 2024-06-18 ENCOUNTER — OFFICE VISIT (OUTPATIENT)
Dept: FAMILY MEDICINE | Facility: CLINIC | Age: 59
End: 2024-06-18
Payer: COMMERCIAL

## 2024-06-18 VITALS
SYSTOLIC BLOOD PRESSURE: 156 MMHG | WEIGHT: 204.6 LBS | DIASTOLIC BLOOD PRESSURE: 100 MMHG | HEIGHT: 66 IN | OXYGEN SATURATION: 99 % | HEART RATE: 82 BPM | BODY MASS INDEX: 32.88 KG/M2 | RESPIRATION RATE: 16 BRPM | TEMPERATURE: 98.2 F

## 2024-06-18 DIAGNOSIS — K80.10 CALCULUS OF GALLBLADDER WITH CHRONIC CHOLECYSTITIS WITHOUT OBSTRUCTION: ICD-10-CM

## 2024-06-18 DIAGNOSIS — Z01.818 PREOPERATIVE EXAMINATION: Primary | ICD-10-CM

## 2024-06-18 LAB
ANION GAP SERPL CALCULATED.3IONS-SCNC: 10 MMOL/L (ref 7–15)
BUN SERPL-MCNC: 10.9 MG/DL (ref 6–20)
CALCIUM SERPL-MCNC: 9.5 MG/DL (ref 8.6–10)
CHLORIDE SERPL-SCNC: 102 MMOL/L (ref 98–107)
CREAT SERPL-MCNC: 0.79 MG/DL (ref 0.67–1.17)
DEPRECATED HCO3 PLAS-SCNC: 25 MMOL/L (ref 22–29)
EGFRCR SERPLBLD CKD-EPI 2021: >90 ML/MIN/1.73M2
GLUCOSE SERPL-MCNC: 98 MG/DL (ref 70–99)
HGB BLD-MCNC: 14.2 G/DL (ref 13.3–17.7)
POTASSIUM SERPL-SCNC: 4.3 MMOL/L (ref 3.4–5.3)
SODIUM SERPL-SCNC: 137 MMOL/L (ref 135–145)

## 2024-06-18 PROCEDURE — 80048 BASIC METABOLIC PNL TOTAL CA: CPT | Performed by: FAMILY MEDICINE

## 2024-06-18 PROCEDURE — 85018 HEMOGLOBIN: CPT | Mod: QW | Performed by: FAMILY MEDICINE

## 2024-06-18 PROCEDURE — 99214 OFFICE O/P EST MOD 30 MIN: CPT | Performed by: FAMILY MEDICINE

## 2024-06-18 PROCEDURE — 36415 COLL VENOUS BLD VENIPUNCTURE: CPT | Performed by: FAMILY MEDICINE

## 2024-06-18 NOTE — PROGRESS NOTES
Preoperative Evaluation  [facility]  [address] [address]  Phone: [phone]  Fax: [phone]  Primary Provider: Quentin Shipley,   Pre-op Performing Provider: Ronnie Loredo MD  Jun 18, 2024 6/18/2024   Surgical Information   What procedure is being done? Cholecystectomy   Facility or Hospital where procedure/surgery will be performed: [facility]   Who is doing the procedure / surgery? Fili   Date of surgery / procedure: 6/20/2024   Time of surgery / procedure: 9:30   Where do you plan to recover after surgery? at home with family     Fax number for surgical facility: Note does not need to be faxed, will be available electronically in Epic.    Assessment & Plan     The proposed surgical procedure is considered INTERMEDIATE risk.    Preoperative examination      Calculus of gallbladder with chronic cholecystitis without obstruction        Possible Sleep Apnea: under evaluation           - No identified additional risk factors other than previously addressed    Antiplatelet or Anticoagulation Medication Instructions   - Patient is on no antiplatelet or anticoagulation medications.    Additional Medication Instructions   - ACE/ARB: DO NOT TAKE on day of surgery (minimum 11 hours for general anesthesia).   - Diuretics: DO NOT TAKE on the day of surgery.   - SSRIs, SNRIs, TCAs, Antipsychotics: Continue without modification.     Recommendation  Approval given to proceed with proposed procedure, without further diagnostic evaluation.        Mitra Diaz is a 58 year old, presenting for the following:  Pre-Op Exam (Surgery 06/20/2024 at [facility] with Dr. Penn for cholecystectomy)          6/18/2024     1:07 PM   Additional Questions   Roomed by Gloria CROW CMA   Accompanied by Self     HPI related to upcoming procedure: cholecystectomy        6/18/2024   Pre-Op Questionnaire   Have you ever had a heart attack or stroke? No   Have  you ever had surgery on your heart or blood vessels, such as a stent placement, a coronary artery bypass, or surgery on an artery in your head, neck, heart, or legs? No   Do you have chest pain with activity? No   Do you have a history of heart failure? No   Do you currently have a cold, bronchitis or symptoms of other infection? No   Do you have a cough, shortness of breath, or wheezing? No   Do you or anyone in your family have previous history of blood clots? No   Do you or does anyone in your family have a serious bleeding problem such as prolonged bleeding following surgeries or cuts? No   Have you ever had problems with anemia or been told to take iron pills? No   Have you had any abnormal blood loss such as black, tarry or bloody stools? No   Have you ever had a blood transfusion? No   Are you willing to have a blood transfusion if it is medically needed before, during, or after your surgery? Yes   Have you or any of your relatives ever had problems with anesthesia? No   Do you have sleep apnea, excessive snoring or daytime drowsiness? (!) YES   Do you have a CPAP machine? (!) NO    Do you have any artifical heart valves or other implanted medical devices like a pacemaker, defibrillator, or continuous glucose monitor? No   Do you have artificial joints? No   Are you allergic to latex? No     Health Care Directive  Patient does not have a Health Care Directive or Living Will: Discussed advance care planning with patient; information given to patient to review.    Preoperative Review of    reviewed - no record of controlled substances prescribed.      Status of Chronic Conditions:  HYPERTENSION - Patient has longstanding history of HTN , currently denies any symptoms referable to elevated blood pressure. Specifically denies chest pain, palpitations, dyspnea, orthopnea, PND or peripheral edema. Blood pressure readings have been in normal range. Current medication regimen is as listed below. Patient denies  any side effects of medication.     Crohn's disease is well controlled    Patient Active Problem List    Diagnosis Date Noted    Calculus of gallbladder with chronic cholecystitis without obstruction 04/30/2024     Priority: Medium    NAFLD (nonalcoholic fatty liver disease) 04/24/2024     Priority: Medium    Infective otitis externa, right 03/13/2024     Priority: Medium    Essential hypertension 03/13/2024     Priority: Medium    Snores 03/13/2024     Priority: Medium    Anxiety      Priority: Medium    Crohn's Disease      Priority: Medium    Immunosuppression (H24)      Priority: Medium    Dermatitis 05/04/2015     Priority: Medium      Past Medical History:   Diagnosis Date    Anxiety disorder     Crohn disease (H)     Crohn's disease (H)     Immunosuppression (H24)     Seasonal allergies      Past Surgical History:   Procedure Laterality Date    APPENDECTOMY      CATARACT IOL, RT/LT      TONSILLECTOMY       Current Outpatient Medications   Medication Sig Dispense Refill    acetic acid-hydrocortisone (VOSOL-HC) 1-2 % otic solution Place 3 drops into the right ear 2 times daily 10 mL 0    calcium carbonate 500 mg, elemental, 1250 (500 Ca) MG tablet chewable       clobetasol (TEMOVATE) 0.05 % external solution APPLY TOPICALLY TO THE AFFECTED AREA AS NEEDED FOR ITCHING      fexofenadine (ALLEGRA) 180 MG tablet [FEXOFENADINE (ALLEGRA) 180 MG TABLET] Take 180 mg by mouth as needed.      fish oil-omega-3 fatty acids 1000 MG capsule Take 2 g by mouth daily      fluocinonide (LIDEX) 0.05 % cream [FLUOCINONIDE (LIDEX) 0.05 % CREAM] Apply sparingly to affected ear 3x weekly for itching 30 g 0    hydroCHLOROthiazide 12.5 MG tablet Take 1 tablet (12.5 mg) by mouth daily 90 tablet 1    hydrocortisone (WESTCORT) 0.2 % cream [HYDROCORTISONE (WESTCORT) 0.2 % CREAM] Apply to affected area bid 45 g 1    inFLIXimab (REMICADE) 100 mg injection [INFLIXIMAB (REMICADE) 100 MG INJECTION] Infuse into a venous catheter. Every 8 weeks    "   losartan (COZAAR) 100 MG tablet Take 1 tablet (100 mg) by mouth daily 90 tablet 1    Multiple Vitamins-Minerals (MULTIVITAMIN ADULTS 50+) TABS Take 1 tablet by mouth daily      PARoxetine (PAXIL) 40 MG tablet TAKE ONE TABLET BY MOUTH ONCE DAILY 90 tablet 0    vitamin E (TOCOPHEROL) 100 units (45 mg) capsule take 1 capsule by oral route 2 times every day         No Known Allergies     Social History     Tobacco Use    Smoking status: Never     Passive exposure: Never    Smokeless tobacco: Former     Types: Chew   Substance Use Topics    Alcohol use: Yes     Alcohol/week: 9.0 standard drinks of alcohol     Types: 9 Cans of beer per week     Comment: 3/week       History   Drug Use No               Objective    BP (!) 156/100 (BP Location: Right arm, Patient Position: Sitting, Cuff Size: Adult Large)   Pulse 82   Temp 98.2  F (36.8  C) (Tympanic)   Resp 16   Ht 1.676 m (5' 6\")   Wt 92.8 kg (204 lb 9.6 oz)   SpO2 99%   BMI 33.02 kg/m     Estimated body mass index is 33.02 kg/m  as calculated from the following:    Height as of this encounter: 1.676 m (5' 6\").    Weight as of this encounter: 92.8 kg (204 lb 9.6 oz).  Physical Exam  GENERAL: alert and no distress  EYES: Eyes grossly normal to inspection, PERRL and conjunctivae and sclerae normal  HENT: ear canals and TM's normal, nose and mouth without ulcers or lesions  NECK: no adenopathy, no asymmetry, masses, or scars  RESP: lungs clear to auscultation - no rales, rhonchi or wheezes  CV: regular rate and rhythm, normal S1 S2, no S3 or S4, no murmur, click or rub, no peripheral edema  ABDOMEN: soft, nontender, no hepatosplenomegaly, no masses and bowel sounds normal  MS: no gross musculoskeletal defects noted, no edema  SKIN: no suspicious lesions or rashes  NEURO: Normal strength and tone, mentation intact and speech normal  PSYCH: mentation appears normal, affect normal/bright    Recent Labs   Lab Test 04/24/24  1058 03/28/24  1014 03/13/24  1225   HGB  --  "  --  15.5   PLT  --   --  178    138 138   POTASSIUM 4.3 4.3 4.6   CR 0.81 0.78 0.81      Recent Results (from the past 48 hour(s))   Hemoglobin    Collection Time: 06/18/24  1:53 PM   Result Value Ref Range    Hemoglobin 14.2 13.3 - 17.7 g/dL   Basic metabolic panel  (Ca, Cl, CO2, Creat, Gluc, K, Na, BUN)    Collection Time: 06/18/24  1:53 PM   Result Value Ref Range    Sodium 137 135 - 145 mmol/L    Potassium 4.3 3.4 - 5.3 mmol/L    Chloride 102 98 - 107 mmol/L    Carbon Dioxide (CO2) 25 22 - 29 mmol/L    Anion Gap 10 7 - 15 mmol/L    Urea Nitrogen 10.9 6.0 - 20.0 mg/dL    Creatinine 0.79 0.67 - 1.17 mg/dL    GFR Estimate >90 >60 mL/min/1.73m2    Calcium 9.5 8.6 - 10.0 mg/dL    Glucose 98 70 - 99 mg/dL      Diagnostics  Labs pending at this time.  Results will be reviewed when available.   No EKG required, no history of coronary heart disease, significant arrhythmia, peripheral arterial disease or other structural heart disease.    Revised Cardiac Risk Index (RCRI)  The patient has the following serious cardiovascular risks for perioperative complications:   - No serious cardiac risks = 0 points     RCRI Interpretation: 0 points: Class I (very low risk - 0.4% complication rate)         Signed Electronically by: Ronnie Loredo MD  Copy of this evaluation report is provided to requesting physician.

## 2024-06-19 ENCOUNTER — ANESTHESIA EVENT (OUTPATIENT)
Dept: SURGERY | Facility: HOSPITAL | Age: 59
End: 2024-06-19
Payer: COMMERCIAL

## 2024-06-20 ENCOUNTER — ANESTHESIA (OUTPATIENT)
Dept: SURGERY | Facility: HOSPITAL | Age: 59
End: 2024-06-20
Payer: COMMERCIAL

## 2024-06-20 ENCOUNTER — HOSPITAL ENCOUNTER (OUTPATIENT)
Facility: HOSPITAL | Age: 59
Discharge: HOME | End: 2024-06-20
Attending: SURGERY | Admitting: SURGERY
Payer: COMMERCIAL

## 2024-06-20 VITALS
RESPIRATION RATE: 15 BRPM | BODY MASS INDEX: 32.59 KG/M2 | WEIGHT: 201.9 LBS | TEMPERATURE: 98.5 F | SYSTOLIC BLOOD PRESSURE: 126 MMHG | HEART RATE: 115 BPM | DIASTOLIC BLOOD PRESSURE: 65 MMHG | OXYGEN SATURATION: 94 %

## 2024-06-20 DIAGNOSIS — K80.10 CALCULUS OF GALLBLADDER WITH CHRONIC CHOLECYSTITIS WITHOUT OBSTRUCTION: Primary | ICD-10-CM

## 2024-06-20 PROCEDURE — 250N000013 HC RX MED GY IP 250 OP 250 PS 637: Performed by: ANESTHESIOLOGY

## 2024-06-20 PROCEDURE — 250N000025 HC SEVOFLURANE, PER MIN: Performed by: SURGERY

## 2024-06-20 PROCEDURE — 258N000003 HC RX IP 258 OP 636: Mod: JZ | Performed by: ANESTHESIOLOGY

## 2024-06-20 PROCEDURE — 272N000001 HC OR GENERAL SUPPLY STERILE: Performed by: SURGERY

## 2024-06-20 PROCEDURE — 360N000080 HC SURGERY LEVEL 7, PER MIN: Performed by: SURGERY

## 2024-06-20 PROCEDURE — 250N000011 HC RX IP 250 OP 636: Mod: JZ | Performed by: ANESTHESIOLOGY

## 2024-06-20 PROCEDURE — 88304 TISSUE EXAM BY PATHOLOGIST: CPT | Mod: TC | Performed by: SURGERY

## 2024-06-20 PROCEDURE — S2900 ROBOTIC SURGICAL SYSTEM: HCPCS | Performed by: SURGERY

## 2024-06-20 PROCEDURE — 250N000009 HC RX 250: Performed by: NURSE ANESTHETIST, CERTIFIED REGISTERED

## 2024-06-20 PROCEDURE — 258N000003 HC RX IP 258 OP 636: Mod: JZ | Performed by: NURSE ANESTHETIST, CERTIFIED REGISTERED

## 2024-06-20 PROCEDURE — 999N000141 HC STATISTIC PRE-PROCEDURE NURSING ASSESSMENT: Performed by: SURGERY

## 2024-06-20 PROCEDURE — 250N000011 HC RX IP 250 OP 636: Performed by: SURGERY

## 2024-06-20 PROCEDURE — 370N000017 HC ANESTHESIA TECHNICAL FEE, PER MIN: Performed by: SURGERY

## 2024-06-20 PROCEDURE — 250N000009 HC RX 250: Performed by: SURGERY

## 2024-06-20 PROCEDURE — 710N000012 HC RECOVERY PHASE 2, PER MINUTE: Performed by: SURGERY

## 2024-06-20 PROCEDURE — 710N000009 HC RECOVERY PHASE 1, LEVEL 1, PER MIN: Performed by: SURGERY

## 2024-06-20 PROCEDURE — 47562 LAPAROSCOPIC CHOLECYSTECTOMY: CPT | Performed by: SURGERY

## 2024-06-20 PROCEDURE — 250N000011 HC RX IP 250 OP 636: Mod: JZ | Performed by: NURSE ANESTHETIST, CERTIFIED REGISTERED

## 2024-06-20 RX ORDER — LIDOCAINE 40 MG/G
CREAM TOPICAL
Status: DISCONTINUED | OUTPATIENT
Start: 2024-06-20 | End: 2024-06-20 | Stop reason: HOSPADM

## 2024-06-20 RX ORDER — DEXAMETHASONE SODIUM PHOSPHATE 4 MG/ML
4 INJECTION, SOLUTION INTRA-ARTICULAR; INTRALESIONAL; INTRAMUSCULAR; INTRAVENOUS; SOFT TISSUE
Status: DISCONTINUED | OUTPATIENT
Start: 2024-06-20 | End: 2024-06-20 | Stop reason: HOSPADM

## 2024-06-20 RX ORDER — PROPOFOL 10 MG/ML
INJECTION, EMULSION INTRAVENOUS PRN
Status: DISCONTINUED | OUTPATIENT
Start: 2024-06-20 | End: 2024-06-20

## 2024-06-20 RX ORDER — LORAZEPAM 2 MG/ML
.5-1 INJECTION INTRAMUSCULAR
Status: DISCONTINUED | OUTPATIENT
Start: 2024-06-20 | End: 2024-06-20 | Stop reason: HOSPADM

## 2024-06-20 RX ORDER — DEXAMETHASONE SODIUM PHOSPHATE 10 MG/ML
4 INJECTION, SOLUTION INTRAMUSCULAR; INTRAVENOUS
Status: DISCONTINUED | OUTPATIENT
Start: 2024-06-20 | End: 2024-06-20 | Stop reason: HOSPADM

## 2024-06-20 RX ORDER — ONDANSETRON 2 MG/ML
4 INJECTION INTRAMUSCULAR; INTRAVENOUS EVERY 30 MIN PRN
Status: DISCONTINUED | OUTPATIENT
Start: 2024-06-20 | End: 2024-06-20 | Stop reason: HOSPADM

## 2024-06-20 RX ORDER — HYDROCODONE BITARTRATE AND ACETAMINOPHEN 5; 325 MG/1; MG/1
1 TABLET ORAL EVERY 6 HOURS PRN
Qty: 10 TABLET | Refills: 0 | Status: SHIPPED | OUTPATIENT
Start: 2024-06-20 | End: 2024-06-23

## 2024-06-20 RX ORDER — KETOROLAC TROMETHAMINE 30 MG/ML
15 INJECTION, SOLUTION INTRAMUSCULAR; INTRAVENOUS
Status: DISCONTINUED | OUTPATIENT
Start: 2024-06-20 | End: 2024-06-20 | Stop reason: HOSPADM

## 2024-06-20 RX ORDER — SODIUM CHLORIDE, SODIUM LACTATE, POTASSIUM CHLORIDE, CALCIUM CHLORIDE 600; 310; 30; 20 MG/100ML; MG/100ML; MG/100ML; MG/100ML
INJECTION, SOLUTION INTRAVENOUS CONTINUOUS
Status: DISCONTINUED | OUTPATIENT
Start: 2024-06-20 | End: 2024-06-20 | Stop reason: HOSPADM

## 2024-06-20 RX ORDER — OXYCODONE HYDROCHLORIDE 5 MG/1
5 TABLET ORAL EVERY 4 HOURS PRN
Status: DISCONTINUED | OUTPATIENT
Start: 2024-06-20 | End: 2024-06-20 | Stop reason: HOSPADM

## 2024-06-20 RX ORDER — EPHEDRINE SULFATE 50 MG/ML
INJECTION, SOLUTION INTRAMUSCULAR; INTRAVENOUS; SUBCUTANEOUS PRN
Status: DISCONTINUED | OUTPATIENT
Start: 2024-06-20 | End: 2024-06-20

## 2024-06-20 RX ORDER — MEPERIDINE HYDROCHLORIDE 25 MG/ML
12.5 INJECTION INTRAMUSCULAR; INTRAVENOUS; SUBCUTANEOUS EVERY 5 MIN PRN
Status: DISCONTINUED | OUTPATIENT
Start: 2024-06-20 | End: 2024-06-20 | Stop reason: HOSPADM

## 2024-06-20 RX ORDER — ONDANSETRON 4 MG/1
4 TABLET, ORALLY DISINTEGRATING ORAL EVERY 30 MIN PRN
Status: DISCONTINUED | OUTPATIENT
Start: 2024-06-20 | End: 2024-06-20 | Stop reason: HOSPADM

## 2024-06-20 RX ORDER — LIDOCAINE HYDROCHLORIDE AND EPINEPHRINE 10; 10 MG/ML; UG/ML
INJECTION, SOLUTION INFILTRATION; PERINEURAL PRN
Status: DISCONTINUED | OUTPATIENT
Start: 2024-06-20 | End: 2024-06-20 | Stop reason: HOSPADM

## 2024-06-20 RX ORDER — FENTANYL CITRATE 50 UG/ML
INJECTION, SOLUTION INTRAMUSCULAR; INTRAVENOUS PRN
Status: DISCONTINUED | OUTPATIENT
Start: 2024-06-20 | End: 2024-06-20

## 2024-06-20 RX ORDER — PROPOFOL 10 MG/ML
INJECTION, EMULSION INTRAVENOUS CONTINUOUS PRN
Status: DISCONTINUED | OUTPATIENT
Start: 2024-06-20 | End: 2024-06-20

## 2024-06-20 RX ORDER — FENTANYL CITRATE 50 UG/ML
25 INJECTION, SOLUTION INTRAMUSCULAR; INTRAVENOUS EVERY 5 MIN PRN
Status: DISCONTINUED | OUTPATIENT
Start: 2024-06-20 | End: 2024-06-20 | Stop reason: HOSPADM

## 2024-06-20 RX ORDER — HYDROMORPHONE HCL IN WATER/PF 6 MG/30 ML
0.4 PATIENT CONTROLLED ANALGESIA SYRINGE INTRAVENOUS EVERY 5 MIN PRN
Status: DISCONTINUED | OUTPATIENT
Start: 2024-06-20 | End: 2024-06-20 | Stop reason: HOSPADM

## 2024-06-20 RX ORDER — FENTANYL CITRATE 50 UG/ML
25 INJECTION, SOLUTION INTRAMUSCULAR; INTRAVENOUS
Status: DISCONTINUED | OUTPATIENT
Start: 2024-06-20 | End: 2024-06-20 | Stop reason: HOSPADM

## 2024-06-20 RX ORDER — CEFAZOLIN SODIUM/WATER 2 G/20 ML
2 SYRINGE (ML) INTRAVENOUS SEE ADMIN INSTRUCTIONS
Status: DISCONTINUED | OUTPATIENT
Start: 2024-06-20 | End: 2024-06-20 | Stop reason: HOSPADM

## 2024-06-20 RX ORDER — DOCUSATE SODIUM 100 MG/1
100 CAPSULE, LIQUID FILLED ORAL 2 TIMES DAILY
Qty: 30 CAPSULE | Refills: 0 | Status: SHIPPED | OUTPATIENT
Start: 2024-06-20 | End: 2024-07-12

## 2024-06-20 RX ORDER — DEXAMETHASONE SODIUM PHOSPHATE 10 MG/ML
INJECTION, SOLUTION INTRAMUSCULAR; INTRAVENOUS PRN
Status: DISCONTINUED | OUTPATIENT
Start: 2024-06-20 | End: 2024-06-20

## 2024-06-20 RX ORDER — FENTANYL CITRATE 50 UG/ML
50 INJECTION, SOLUTION INTRAMUSCULAR; INTRAVENOUS EVERY 5 MIN PRN
Status: DISCONTINUED | OUTPATIENT
Start: 2024-06-20 | End: 2024-06-20 | Stop reason: HOSPADM

## 2024-06-20 RX ORDER — INDOCYANINE GREEN AND WATER 25 MG
2.5 KIT INJECTION ONCE
Status: COMPLETED | OUTPATIENT
Start: 2024-06-20 | End: 2024-06-20

## 2024-06-20 RX ORDER — GLYCOPYRROLATE 0.2 MG/ML
INJECTION, SOLUTION INTRAMUSCULAR; INTRAVENOUS PRN
Status: DISCONTINUED | OUTPATIENT
Start: 2024-06-20 | End: 2024-06-20

## 2024-06-20 RX ORDER — ACETAMINOPHEN 325 MG/1
975 TABLET ORAL
Status: DISCONTINUED | OUTPATIENT
Start: 2024-06-20 | End: 2024-06-20 | Stop reason: HOSPADM

## 2024-06-20 RX ORDER — KETAMINE HYDROCHLORIDE 10 MG/ML
INJECTION INTRAMUSCULAR; INTRAVENOUS PRN
Status: DISCONTINUED | OUTPATIENT
Start: 2024-06-20 | End: 2024-06-20

## 2024-06-20 RX ORDER — MAGNESIUM SULFATE 4 G/50ML
4 INJECTION INTRAVENOUS ONCE
Status: COMPLETED | OUTPATIENT
Start: 2024-06-20 | End: 2024-06-20

## 2024-06-20 RX ORDER — OXYCODONE HYDROCHLORIDE 5 MG/1
10 TABLET ORAL EVERY 4 HOURS PRN
Status: DISCONTINUED | OUTPATIENT
Start: 2024-06-20 | End: 2024-06-20 | Stop reason: HOSPADM

## 2024-06-20 RX ORDER — CEFAZOLIN SODIUM/WATER 2 G/20 ML
2 SYRINGE (ML) INTRAVENOUS
Status: COMPLETED | OUTPATIENT
Start: 2024-06-20 | End: 2024-06-20

## 2024-06-20 RX ORDER — NALOXONE HYDROCHLORIDE 0.4 MG/ML
0.1 INJECTION, SOLUTION INTRAMUSCULAR; INTRAVENOUS; SUBCUTANEOUS
Status: DISCONTINUED | OUTPATIENT
Start: 2024-06-20 | End: 2024-06-20 | Stop reason: HOSPADM

## 2024-06-20 RX ORDER — HYDROMORPHONE HCL IN WATER/PF 6 MG/30 ML
0.2 PATIENT CONTROLLED ANALGESIA SYRINGE INTRAVENOUS EVERY 5 MIN PRN
Status: DISCONTINUED | OUTPATIENT
Start: 2024-06-20 | End: 2024-06-20 | Stop reason: HOSPADM

## 2024-06-20 RX ORDER — LIDOCAINE HYDROCHLORIDE 10 MG/ML
INJECTION, SOLUTION INFILTRATION; PERINEURAL PRN
Status: DISCONTINUED | OUTPATIENT
Start: 2024-06-20 | End: 2024-06-20

## 2024-06-20 RX ORDER — ACETAMINOPHEN 325 MG/1
975 TABLET ORAL ONCE
Status: COMPLETED | OUTPATIENT
Start: 2024-06-20 | End: 2024-06-20

## 2024-06-20 RX ADMIN — PROPOFOL 125 MCG/KG/MIN: 10 INJECTION, EMULSION INTRAVENOUS at 09:25

## 2024-06-20 RX ADMIN — SODIUM CHLORIDE, POTASSIUM CHLORIDE, SODIUM LACTATE AND CALCIUM CHLORIDE: 600; 310; 30; 20 INJECTION, SOLUTION INTRAVENOUS at 08:50

## 2024-06-20 RX ADMIN — GLYCOPYRROLATE 0.4 MG: 0.2 INJECTION INTRAMUSCULAR; INTRAVENOUS at 09:52

## 2024-06-20 RX ADMIN — PHENYLEPHRINE HYDROCHLORIDE 100 MCG: 10 INJECTION INTRAVENOUS at 10:16

## 2024-06-20 RX ADMIN — PHENYLEPHRINE HYDROCHLORIDE 100 MCG: 10 INJECTION INTRAVENOUS at 10:06

## 2024-06-20 RX ADMIN — INDOCYANINE GREEN AND WATER 2.5 MG: KIT at 08:58

## 2024-06-20 RX ADMIN — HYDROMORPHONE HYDROCHLORIDE 0.5 MG: 1 INJECTION, SOLUTION INTRAMUSCULAR; INTRAVENOUS; SUBCUTANEOUS at 10:26

## 2024-06-20 RX ADMIN — PROPOFOL 170 MG: 10 INJECTION, EMULSION INTRAVENOUS at 09:25

## 2024-06-20 RX ADMIN — ACETAMINOPHEN 975 MG: 325 TABLET ORAL at 08:50

## 2024-06-20 RX ADMIN — PHENYLEPHRINE HYDROCHLORIDE 200 MCG: 10 INJECTION INTRAVENOUS at 09:38

## 2024-06-20 RX ADMIN — Medication 15 MG: at 09:51

## 2024-06-20 RX ADMIN — DEXMEDETOMIDINE HYDROCHLORIDE 12 MCG: 100 INJECTION, SOLUTION INTRAVENOUS at 09:54

## 2024-06-20 RX ADMIN — SODIUM CHLORIDE, POTASSIUM CHLORIDE, SODIUM LACTATE AND CALCIUM CHLORIDE: 600; 310; 30; 20 INJECTION, SOLUTION INTRAVENOUS at 10:24

## 2024-06-20 RX ADMIN — Medication 2 G: at 09:30

## 2024-06-20 RX ADMIN — PHENYLEPHRINE HYDROCHLORIDE 200 MCG: 10 INJECTION INTRAVENOUS at 09:49

## 2024-06-20 RX ADMIN — ROCURONIUM BROMIDE 50 MG: 50 INJECTION, SOLUTION INTRAVENOUS at 09:25

## 2024-06-20 RX ADMIN — ROCURONIUM BROMIDE 30 MG: 50 INJECTION, SOLUTION INTRAVENOUS at 10:19

## 2024-06-20 RX ADMIN — MIDAZOLAM 2 MG: 1 INJECTION INTRAMUSCULAR; INTRAVENOUS at 09:16

## 2024-06-20 RX ADMIN — SODIUM CHLORIDE, POTASSIUM CHLORIDE, SODIUM LACTATE AND CALCIUM CHLORIDE: 600; 310; 30; 20 INJECTION, SOLUTION INTRAVENOUS at 11:02

## 2024-06-20 RX ADMIN — MAGNESIUM SULFATE HEPTAHYDRATE 4 G: 80 INJECTION, SOLUTION INTRAVENOUS at 09:01

## 2024-06-20 RX ADMIN — FENTANYL CITRATE 100 MCG: 50 INJECTION INTRAMUSCULAR; INTRAVENOUS at 09:25

## 2024-06-20 RX ADMIN — KETAMINE HYDROCHLORIDE 30 MG: 10 INJECTION INTRAMUSCULAR; INTRAVENOUS at 10:01

## 2024-06-20 RX ADMIN — LIDOCAINE HYDROCHLORIDE 5 ML: 10 INJECTION, SOLUTION INFILTRATION; PERINEURAL at 09:25

## 2024-06-20 RX ADMIN — OXYCODONE HYDROCHLORIDE 5 MG: 5 TABLET ORAL at 12:24

## 2024-06-20 RX ADMIN — DEXAMETHASONE SODIUM PHOSPHATE 10 MG: 10 INJECTION, SOLUTION INTRAMUSCULAR; INTRAVENOUS at 09:26

## 2024-06-20 ASSESSMENT — ACTIVITIES OF DAILY LIVING (ADL)
ADLS_ACUITY_SCORE: 29
ADLS_ACUITY_SCORE: 27
ADLS_ACUITY_SCORE: 29
ADLS_ACUITY_SCORE: 30

## 2024-06-20 ASSESSMENT — LIFESTYLE VARIABLES: TOBACCO_USE: 0

## 2024-06-20 NOTE — ANESTHESIA POSTPROCEDURE EVALUATION
Patient: Joe Pierce    Procedure: Procedure(s):  CHOLECYSTECTOMY, ROBOT-ASSISTED, LAPAROSCOPIC, USING DA MARCUS XI       Anesthesia Type:  General    Note:  Disposition: Outpatient   Postop Pain Control: Uneventful            Sign Out: Well controlled pain   PONV: No   Neuro/Psych: Uneventful            Sign Out: Acceptable/Baseline neuro status   Airway/Respiratory: Uneventful            Sign Out: Acceptable/Baseline resp. status   CV/Hemodynamics: Uneventful            Sign Out: Acceptable CV status; No obvious hypovolemia; No obvious fluid overload   Other NRE: NONE   DID A NON-ROUTINE EVENT OCCUR? No       Last vitals:  Vitals Value Taken Time   /68 06/20/24 1145   Temp 37.1  C (98.8  F) 06/20/24 1145   Pulse 115 06/20/24 1154   Resp 16 06/20/24 1154   SpO2 94 % 06/20/24 1154   Vitals shown include unfiled device data.    Electronically Signed By: Luis Garza MD  June 20, 2024  12:42 PM

## 2024-06-20 NOTE — ANESTHESIA PREPROCEDURE EVALUATION
Anesthesia Pre-Procedure Evaluation    Patient: Joe Pierce   MRN: [id number] : 1965        Procedure : Procedure(s):  CHOLECYSTECTOMY, ROBOT-ASSISTED, LAPAROSCOPIC, USING DA MARCUS XI          Past Medical History:   Diagnosis Date    Anxiety disorder     Crohn's disease (H)     Hypertension     Immunosuppression (H24)     NAFLD (nonalcoholic fatty liver disease)     Seasonal allergies     Snores       Past Surgical History:   Procedure Laterality Date    APPENDECTOMY      CATARACT IOL, RT/LT      TONSILLECTOMY        No Known Allergies   Social History     Tobacco Use    Smoking status: Never     Passive exposure: Never    Smokeless tobacco: Former     Types: Chew   Substance Use Topics    Alcohol use: Yes     Alcohol/week: 9.0 standard drinks of alcohol     Types: 9 Cans of beer per week     Comment: 3/week      Wt Readings from Last 1 Encounters:   24 92.8 kg (204 lb 9.6 oz)        Anesthesia Evaluation            ROS/MED HX  ENT/Pulmonary:     (+)     BRIAN risk factors, snores loudly, hypertension,    allergic rhinitis,                          (-) tobacco use   Neurologic:  - neg neurologic ROS     Cardiovascular:     (+)  hypertension- -   -  - -                                      METS/Exercise Tolerance:     Hematologic:  - neg hematologic  ROS     Musculoskeletal:  - neg musculoskeletal ROS     GI/Hepatic:     (+)       Inflammatory bowel disease,      liver disease,       Renal/Genitourinary:       Endo:     (+)               Obesity,       Psychiatric/Substance Use:     (+) psychiatric history anxiety       Infectious Disease:  - neg infectious disease ROS     Malignancy:  - neg malignancy ROS     Other:  - neg other ROS          Physical Exam    Airway      Comment: Thin, small beard.    Mallampati: II   TM distance: > 3 FB   Neck ROM: full   Mouth opening: > 3 cm    Respiratory Devices and Support         Dental       (+) Minor Abnormalities - some fillings, tiny  "chips      Cardiovascular   cardiovascular exam normal          Pulmonary   pulmonary exam normal                OUTSIDE LABS:  CBC:   Lab Results   Component Value Date    WBC 4.6 03/13/2024    WBC 5.2 06/18/2019    HGB 14.2 06/18/2024    HGB 15.5 03/13/2024    HCT 45.8 03/13/2024    HCT 40.7 06/18/2019     03/13/2024     06/18/2019     BMP:   Lab Results   Component Value Date     06/18/2024     04/24/2024    POTASSIUM 4.3 06/18/2024    POTASSIUM 4.3 04/24/2024    CHLORIDE 102 06/18/2024    CHLORIDE 102 04/24/2024    CO2 25 06/18/2024    CO2 23 04/24/2024    BUN 10.9 06/18/2024    BUN 10.3 04/24/2024    CR 0.79 06/18/2024    CR 0.81 04/24/2024    GLC 98 06/18/2024    GLC 90 04/24/2024     COAGS: No results found for: \"PTT\", \"INR\", \"FIBR\"  POC: No results found for: \"BGM\", \"HCG\", \"HCGS\"  HEPATIC:   Lab Results   Component Value Date    ALT 45 04/24/2024    AST 39 04/24/2024     OTHER:   Lab Results   Component Value Date    ARMAND 9.5 06/18/2024    TSH 2.19 03/13/2024       Anesthesia Plan    ASA Status:  2    NPO Status:  NPO Appropriate    Anesthesia Type: General.     - Airway: ETT   Induction: Intravenous, Propofol.   Maintenance: Balanced.        Consents    Anesthesia Plan(s) and associated risks, benefits, and realistic alternatives discussed. Questions answered and patient/representative(s) expressed understanding.     - Discussed:     - Discussed with:  Patient      - Extended Intubation/Ventilatory Support Discussed: No.      - Patient is DNR/DNI Status: No     Use of blood products discussed: Yes.     - Discussed with: Patient.     - Consented: consented to blood products     Postoperative Care    Pain management: IV analgesics, Oral pain medications, Multi-modal analgesia.   PONV prophylaxis: Ondansetron (or other 5HT-3), Dexamethasone or Solumedrol     Comments:    Other Comments: Decadron, Zofran.  Diprivan infusion.  No Toradol; Tylenol.  Ketamine (0.5 mg/kg).  Magnesium. " "          Luis Garza MD    I have reviewed the pertinent notes and labs in the chart from the past 30 days and (re)examined the patient.  Any updates or changes from those notes are reflected in this note.              # Obesity: Estimated body mass index is 33.02 kg/m  as calculated from the following:    Height as of 6/18/24: 1.676 m (5' 6\").    Weight as of 6/18/24: 92.8 kg (204 lb 9.6 oz).      "

## 2024-06-20 NOTE — ANESTHESIA CARE TRANSFER NOTE
Patient: Joe Pierce    Procedure: Procedure(s):  CHOLECYSTECTOMY, ROBOT-ASSISTED, LAPAROSCOPIC, USING DA MARCUS XI       Diagnosis: Chronic cholecystitis [K81.1]  Diagnosis Additional Information: No value filed.    Anesthesia Type:   General     Note:    Oropharynx: oropharynx clear of all foreign objects  Level of Consciousness: drowsy  Oxygen Supplementation: face mask  Level of Supplemental Oxygen (L/min / FiO2): 8  Independent Airway: airway patency satisfactory and stable  Dentition: dentition unchanged  Vital Signs Stable: post-procedure vital signs reviewed and stable  Report to RN Given: handoff report given  Patient transferred to: PACU    Handoff Report: Identifed the Patient, Identified the Reponsible Provider, Reviewed the pertinent medical history, Discussed the surgical course, Reviewed Intra-OP anesthesia mangement and issues during anesthesia, Set expectations for post-procedure period and Allowed opportunity for questions and acknowledgement of understanding      Vitals:  Vitals Value Taken Time   /79 06/20/24 1056   Temp     Pulse 115 06/20/24 1057   Resp 22 06/20/24 1057   SpO2 96 % 06/20/24 1057   Vitals shown include unfiled device data.    Electronically Signed By: SEFERINO Haskins CRNA  June 20, 2024  10:59 AM

## 2024-06-20 NOTE — H&P
General Surgery H&P  Joe Pierce MRN# [id number]   Age/Sex: 58 year old male YOB: 1965     Reason for visit: Chronic cholecystitis       Referring physician: Dr. Shipley                   Assessment and Plan:   Assessment:  1.  History of chronic cholecystitis  2.  Cholelithiasis    Plan:  -To the OR today for robotic cholecystectomy  - The risks and benefits of the procedure were explained detail to the patient. The risks include infection, bleeding, damage to the surrounding structures. Patient verbalized understanding provided consent to undergo the procedure above.            Chief Complaint:   Here for surgery     History is obtained from the patient    HPI:   Joe Pierce is a 58 year old male who presents for robotic cholecystectomy.  Patient has not had any acute changes to his medical history since the last time that we met.          Past Medical History:     Past Medical History:   Diagnosis Date    Anxiety disorder     Crohn's disease (H)     Hypertension     Immunosuppression (H24)     NAFLD (nonalcoholic fatty liver disease)     Seasonal allergies     Snores               Past Surgical History:     Past Surgical History:   Procedure Laterality Date    APPENDECTOMY      CATARACT IOL, RT/LT      TONSILLECTOMY               Social History:    reports that he has never smoked. He has never been exposed to tobacco smoke. He has quit using smokeless tobacco.  His smokeless tobacco use included chew. He reports current alcohol use of about 9.0 standard drinks of alcohol per week. He reports that he does not use drugs.           Family History:     Family History   Problem Relation Age of Onset    Ovarian Cancer Mother     Breast Cancer Mother     Coronary Artery Disease Father     No Known Problems Maternal Grandmother     No Known Problems Maternal Grandfather     No Known Problems Paternal Grandmother     No Known Problems Paternal Grandfather     No Known Problems Brother     No Known  Problems Brother               Allergies:   No Known Allergies           Medications:     Prior to Admission medications    Medication Sig Start Date End Date Taking? Authorizing Provider   acetic acid-hydrocortisone (VOSOL-HC) 1-2 % otic solution Place 3 drops into the right ear 2 times daily 3/13/24   Monty Morel MD   calcium carbonate 500 mg, elemental, 1250 (500 Ca) MG tablet chewable  7/20/22   Reported, Patient   clobetasol (TEMOVATE) 0.05 % external solution APPLY TOPICALLY TO THE AFFECTED AREA AS NEEDED FOR ITCHING 1/5/21   Reported, Patient   fexofenadine (ALLEGRA) 180 MG tablet [FEXOFENADINE (ALLEGRA) 180 MG TABLET] Take 180 mg by mouth as needed. 5/4/15   Provider, Historical   fish oil-omega-3 fatty acids 1000 MG capsule Take 2 g by mouth daily    Reported, Patient   fluocinonide (LIDEX) 0.05 % cream [FLUOCINONIDE (LIDEX) 0.05 % CREAM] Apply sparingly to affected ear 3x weekly for itching 6/10/21   Clarence Ventura MD   hydroCHLOROthiazide 12.5 MG tablet Take 1 tablet (12.5 mg) by mouth daily 4/24/24   Monty Morel MD   hydrocortisone (WESTCORT) 0.2 % cream [HYDROCORTISONE (WESTCORT) 0.2 % CREAM] Apply to affected area bid 4/11/17   Yudi Gibbs MD   inFLIXimab (REMICADE) 100 mg injection [INFLIXIMAB (REMICADE) 100 MG INJECTION] Infuse into a venous catheter. Every 8 weeks 5/4/15   Provider, Historical   losartan (COZAAR) 100 MG tablet Take 1 tablet (100 mg) by mouth daily 4/24/24   Monty Morel MD   Multiple Vitamins-Minerals (MULTIVITAMIN ADULTS 50+) TABS Take 1 tablet by mouth daily    Reported, Patient   PARoxetine (PAXIL) 40 MG tablet TAKE ONE TABLET BY MOUTH ONCE DAILY 3/25/24   Quentin Shipley DO   vitamin E (TOCOPHEROL) 100 units (45 mg) capsule take 1 capsule by oral route 2 times every day 7/20/22   Reported, Patient              Review of Systems:   A 12 point Review of Systems is negative other than noted in the HPI            Physical Exam:   No data found.     No  intake or output data in the 24 hours ending 06/20/24 0714   Constitutional:   awake, alert, cooperative, no apparent distress, and appears stated age       Eyes:   PERRL, conjunctiva/corneas clear, EOM's intact; no scleral edema or icterus noted        ENT:   Normocephalic, without obvious abnormality, atraumatic, Lips, mucosa, and tongue normal        Hematologic / Lymphatic:   No lymphadenopathy       Lungs:   Normal respiratory effort, no accessory muscle use       Cardiovascular:   Regular rate and rhythm       Abdomen:   Soft, nondistended, nontender to palpation       Musculoskeletal:   No obvious swelling, bruising or deformity       Skin:   Skin color and texture normal for patient, no rashes or lesions              Data:            Niko Penn DO  General Surgeon  [facility]  [facility] - [address] [address]?  Office: [phone]  Employed by - [facility] [employer] [employer]  Pager: [phone]

## 2024-06-20 NOTE — DISCHARGE INSTRUCTIONS
Follow up: Please call us at [phone] to schedule an appointment at your convenience.      If you would prefer to follow up with us by phone please let us know so that we may contact you 2-3 weeks following your procedure.         Diet: Regular diet.  Foods high infiber are recommended with 8 to 10 glasses of fluids each day.     Patients can have difficulty with constipation following surgery, due in part to the administration of narcotic medications.  If you are suffering with constipation, you should avoid foods such as hard cheeses or red meat.      Activity: You should continue to be active at home, including ambulating frequently.  If possible try to limit the amount of time spent in bed.     Restrictions: You have no lifting restrictions post operatively, but may wish to avoid strenuous physical activity for 1-2 weeks.  You should limit your physical activity if it causes you discomfort; however, this should resolve within 1-2 weeks.   Walking does not count as strenuous physical activity.  You are safe to walk up and down stairs.  Following 2 weeks you may resume all normal physical activity.     Wound / drain care: Your incisions are closed using absorbale sutures.  The skin is sealed with a surgical glue.  Do not peal the glue off.  Please allow the glue to peal off on its own.      It is normal to have a small rim of red present around the incisions. This should not, however, extend beyond 1/4 inch from the incision.  If your incisions become increasingly tender, red, or draining, please contact us.        Bathing: You may shower after 24 hours from surgery.  It is ok to get your incisions wet, but avoid rubbing them.  Avoid soaking in bath tubs, or swimming in lakes, pools, or streams for 4 weeks following surgery.

## 2024-06-20 NOTE — OP NOTE
[facility]    Operative Note    Pre-operative diagnosis: Chronic cholecystitis [K81.1]  Post-operative diagnosis Same as pre-operative diagnosis    Procedure: CHOLECYSTECTOMY, ROBOT-ASSISTED, LAPAROSCOPIC, USING DA MARCUS XI, N/A - Abdomen    Surgeon: Surgeons and Role:     * Niko Penn DO - Primary  Anesthesia: General   Estimated Blood Loss: 5 mL     Drains: None  Specimens:   ID Type Source Tests Collected by Time Destination   1 : GALLBLADDER Tissue Gallbladder SURGICAL PATHOLOGY EXAM Niko Penn DO 6/20/2024 10:29 AM      Findings:     - large gallstones.  - acute on chronic cholecystitis   -Operative with cholangiogram to confirm anatomy    Complications: None.  Implants: * No implants in log *      Indication: 58-year-old male presenting with abdominal pain.  Patient was worked up and found to have chronic cholecystitis.  After evaluation, offered patient procedure of robotic cholecystectomy.  The risks and benefits of the procedure were explained detail to the patient. The risks include infection, bleeding, damage to the surrounding structures. Patient verbalized understanding provided consent to undergo the procedure above.       Procedure: Patient was brought back to the operating room and was placed on the operating table in the supine position.  The patient's extremities were padded and positioned in the usual fashion.  The patient then underwent anesthesia sedation and intubation.  The patient's abdomen was prepped and draped in the usual sterile fashion.  Prior to initiating the procedure, a timeout was completed.  All present were in agreement.    1% lidocaine with epinephrine was instilled at Burns's point in the left upper quadrant.  A 15 blade was used to make a skin knick incision at Burns's point.  A Veress needle was inserted into the abdomen and insufflation was initiated. An 8 mm trocar was inserted at the infraumbilical port site.  Insufflation was then  initiated.  Once insufflation was completed, a general survey was completed.  I could identify that the patient had no injury of the abdominal contents upon entering the abdomen.  Acute on chronic cholecystitis.  Patient had significant adhesions of the omentum covering most of his gallbladder.    An 8 mm port was placed to the right of the umbilicus.  Two 8 mm ports were placed in the left abdominal quadrant.  These ports were placed under direct visualization.  The robot was then docked and the robotic instruments were then inserted into the abdomen under direct visualization.  The gallbladder was lifted up slowly and the omentum was carefully dissected off the anterior face of the gallbladder.      The gallbladder was then grasped with tip up graspers and retracted cephalad.  The cystic duct and cystic artery were then carefully dissected and isolated with a combination of blunt dissection with the hook electrocautery.  Once the cystic artery and cystic duct were isolated the critical view was obtained.  Operative cholangiogram was completed to confirm anatomy with firefly.  Weck clips were used to clip across the cystic artery and duct.  The hook cautery was used to transect between the Weck clips of the cystic duct and cystic artery.  The gallbladder was removed off of the liver bed using electrocautery.  The gallbladder was then removed from the abdomen using an Endo Catch bag through the 8 mm left lower quadrant port.  Gallbladder was noted to have 2 large gallstones.    Inspection of the liver bed revealed good hemostasis.  The fascia of the gallbladder extraction site was then closed using an 0 Vicryl suture with a suture passer.  A 3-0 Vicryl suture was used to perform deep dermal sutures at the 12 mm port site.  All surgical sites were then closed with a 4-0 Vicryl suture in a subcuticular fashion.  Surgical glue was used to reinforce all surgical skin incisions.  At the end of the procedure, a final  count was completed.  I was told all sharps, sponges, instruments were accounted for.  The patient tolerated the procedure with no complications.  The patient was then extubated and brought back to the PACU in stable condition.                Niko Penn DO  General Surgeon  [facility]  Surgery [address] [address]?  Office: [phone]  Employed by - [employer] [facility] [employer]  Pager: [phone]

## 2024-06-21 LAB
PATH REPORT.COMMENTS IMP SPEC: NORMAL
PATH REPORT.COMMENTS IMP SPEC: NORMAL
PATH REPORT.FINAL DX SPEC: NORMAL
PATH REPORT.GROSS SPEC: NORMAL
PATH REPORT.MICROSCOPIC SPEC OTHER STN: NORMAL
PATH REPORT.RELEVANT HX SPEC: NORMAL
PHOTO IMAGE: NORMAL

## 2024-06-21 PROCEDURE — 88304 TISSUE EXAM BY PATHOLOGIST: CPT | Mod: 26 | Performed by: PATHOLOGY

## 2024-06-24 ENCOUNTER — TELEPHONE (OUTPATIENT)
Dept: SURGERY | Facility: CLINIC | Age: 59
End: 2024-06-24
Payer: COMMERCIAL

## 2024-06-24 NOTE — TELEPHONE ENCOUNTER
[facility] Post-Op Phone Call                     Surgeon: Niko Penn DO    Surgery: Robot-assisted Laparoscopic Cholecystectomy  Date of Surgery: 6/20/2024  Discharge Date: 6/20/2024    Date/Time Called:   Date: 6/24/2024 Time: 10:19 AM   Attempt: First    Pain Control:  Intensity: Mild (1 - 3)  Duration/Location/Explain: Mild incision site pain that improving day by day. Currently just taking Tylenol PRN for pain control.    Incisions:  Drainage? clean and dry  Any fever type symptoms? No    GI:  Nausea? No  Vomiting? No  BM? Yes  Gas? Yes  Voiding Frequency? 4 or more/day   Appetite? Good    Activity:  Walking activity? Yes  Frequency/Type: Ambulating frequently as tolerated.  Restrictions: Avoid strenuous physical activity for  1-2 weeks.    Return to Work Plans?  Patient does not need any work letters or forms filled out.    Post-op appointment made? No      Thank you for your time. Please do not hesitate to call us with any questions or concerns.    Call completed by: Grecia CARVAJAL RN

## 2024-06-26 ENCOUNTER — TELEPHONE (OUTPATIENT)
Dept: FAMILY MEDICINE | Facility: CLINIC | Age: 59
End: 2024-06-26
Payer: COMMERCIAL

## 2024-06-26 NOTE — TELEPHONE ENCOUNTER
Patient Quality Outreach    Patient is due for the following:   Hypertension -  BP check  Physical Preventive Adult Physical    Next Steps:   Schedule a Adult Preventative  Per chart review for his BP reading was good on 6/20/24 with readings of 126/65 and this BP was already resulted in vitals section in the pt's chart. The last BP shown in the reports was not the most current up-to-date reading. FYI: The date shown was 6/18/24 with readings of 156/100. xl    Type of outreach:    Sent [facility] message.      Questions for provider review:    None           Crystal Leone MA

## 2024-06-27 ENCOUNTER — TELEPHONE (OUTPATIENT)
Dept: FAMILY MEDICINE | Facility: CLINIC | Age: 59
End: 2024-06-27
Payer: COMMERCIAL

## 2024-06-27 DIAGNOSIS — F41.1 ANXIETY STATE: ICD-10-CM

## 2024-06-27 RX ORDER — PAROXETINE 40 MG/1
40 TABLET, FILM COATED ORAL DAILY
Qty: 30 TABLET | Refills: 0 | Status: SHIPPED | OUTPATIENT
Start: 2024-06-27 | End: 2024-07-12

## 2024-06-27 NOTE — TELEPHONE ENCOUNTER
Patient is overdue for medication check with Dr. Shipley. Please assist him in scheduling. I have sent in a 30 day supply.

## 2024-07-09 DIAGNOSIS — F41.1 ANXIETY STATE: Primary | ICD-10-CM

## 2024-07-09 RX ORDER — PAROXETINE 40 MG/1
40 TABLET, FILM COATED ORAL EVERY MORNING
Qty: 5 TABLET | Refills: 0 | Status: SHIPPED | OUTPATIENT
Start: 2024-07-09 | End: 2024-07-12

## 2024-07-09 NOTE — TELEPHONE ENCOUNTER
Called patient and let him know prescription was sent to pharmacy as requested.  Patient appreciative of call back and will  medication today.  No further questions/concerns.      Blanca Saez RN  [facility]

## 2024-07-09 NOTE — TELEPHONE ENCOUNTER
"Requesting 5 day supply be sent to local pharmacy, [facility] in [place], while waiting for his prescription to arrive from [facility].      Patient walked into clinic to see if he could get a 5 day supply of paroxetine 40 mg sent to local [facility] as he's been out of medication since 7/3/24, and hasn't received his mail order prescription from [facility] yet.  Patient called his pharmacy today and received notification his prescription was just sent out but he won't get it for another few days yet, and now starting to feel \"a little lightheaded.\"      Of note, patient did request refill back on 6/27/24 (please see telephone encounter from 6/27/24 for further details) and has been waiting for it to arrive.  He has an upcoming appointment with Dr. Shipley this coming Friday, 7/12/24.      Rx pended below for review/approval, if appropriate.           Blanca Saez RN  [facility]   "

## 2024-07-12 ENCOUNTER — OFFICE VISIT (OUTPATIENT)
Dept: FAMILY MEDICINE | Facility: CLINIC | Age: 59
End: 2024-07-12
Payer: COMMERCIAL

## 2024-07-12 VITALS
DIASTOLIC BLOOD PRESSURE: 83 MMHG | BODY MASS INDEX: 32.53 KG/M2 | TEMPERATURE: 98.6 F | SYSTOLIC BLOOD PRESSURE: 128 MMHG | RESPIRATION RATE: 12 BRPM | HEIGHT: 66 IN | WEIGHT: 202.4 LBS | HEART RATE: 79 BPM | OXYGEN SATURATION: 95 %

## 2024-07-12 DIAGNOSIS — F41.1 ANXIETY STATE: Primary | ICD-10-CM

## 2024-07-12 DIAGNOSIS — I10 ESSENTIAL HYPERTENSION: ICD-10-CM

## 2024-07-12 DIAGNOSIS — Z23 NEED FOR VACCINATION: ICD-10-CM

## 2024-07-12 PROBLEM — K80.10 CALCULUS OF GALLBLADDER WITH CHRONIC CHOLECYSTITIS WITHOUT OBSTRUCTION: Status: RESOLVED | Noted: 2024-04-30 | Resolved: 2024-07-12

## 2024-07-12 PROCEDURE — 96127 BRIEF EMOTIONAL/BEHAV ASSMT: CPT | Performed by: FAMILY MEDICINE

## 2024-07-12 PROCEDURE — 90471 IMMUNIZATION ADMIN: CPT | Performed by: FAMILY MEDICINE

## 2024-07-12 PROCEDURE — 90715 TDAP VACCINE 7 YRS/> IM: CPT | Performed by: FAMILY MEDICINE

## 2024-07-12 PROCEDURE — 90750 HZV VACC RECOMBINANT IM: CPT | Performed by: FAMILY MEDICINE

## 2024-07-12 PROCEDURE — 99214 OFFICE O/P EST MOD 30 MIN: CPT | Mod: 25 | Performed by: FAMILY MEDICINE

## 2024-07-12 PROCEDURE — 90472 IMMUNIZATION ADMIN EACH ADD: CPT | Performed by: FAMILY MEDICINE

## 2024-07-12 RX ORDER — PAROXETINE 40 MG/1
40 TABLET, FILM COATED ORAL EVERY MORNING
Qty: 90 TABLET | Refills: 3 | Status: SHIPPED | OUTPATIENT
Start: 2024-07-12 | End: 2024-08-13

## 2024-07-12 ASSESSMENT — ANXIETY QUESTIONNAIRES
GAD7 TOTAL SCORE: 1
7. FEELING AFRAID AS IF SOMETHING AWFUL MIGHT HAPPEN: NOT AT ALL
IF YOU CHECKED OFF ANY PROBLEMS ON THIS QUESTIONNAIRE, HOW DIFFICULT HAVE THESE PROBLEMS MADE IT FOR YOU TO DO YOUR WORK, TAKE CARE OF THINGS AT HOME, OR GET ALONG WITH OTHER PEOPLE: NOT DIFFICULT AT ALL
2. NOT BEING ABLE TO STOP OR CONTROL WORRYING: NOT AT ALL
GAD7 TOTAL SCORE: 1
4. TROUBLE RELAXING: SEVERAL DAYS
5. BEING SO RESTLESS THAT IT IS HARD TO SIT STILL: NOT AT ALL
7. FEELING AFRAID AS IF SOMETHING AWFUL MIGHT HAPPEN: NOT AT ALL
3. WORRYING TOO MUCH ABOUT DIFFERENT THINGS: NOT AT ALL
6. BECOMING EASILY ANNOYED OR IRRITABLE: NOT AT ALL
8. IF YOU CHECKED OFF ANY PROBLEMS, HOW DIFFICULT HAVE THESE MADE IT FOR YOU TO DO YOUR WORK, TAKE CARE OF THINGS AT HOME, OR GET ALONG WITH OTHER PEOPLE?: NOT DIFFICULT AT ALL
1. FEELING NERVOUS, ANXIOUS, OR ON EDGE: NOT AT ALL

## 2024-07-12 NOTE — ASSESSMENT & PLAN NOTE
Below goal of 140/90 on current regimen.  Discussed how to take proper blood pressure at home with resting.  If still getting high readings on his home machine, contact clinic and will set up a nurse visit so we can compare his home readings to office readings.

## 2024-07-12 NOTE — PROGRESS NOTES
Assessment & Plan   Problem List Items Addressed This Visit       Anxiety     Very well-controlled on current dosing of Paxil.  Will continue current dosing.  Did discuss the possibility of reducing but at this point in time patient would like to stay at current dose.         Relevant Medications    PARoxetine (PAXIL) 40 MG tablet    Essential hypertension     Below goal of 140/90 on current regimen.  Discussed how to take proper blood pressure at home with resting.  If still getting high readings on his home machine, contact clinic and will set up a nurse visit so we can compare his home readings to office readings.          Other Visit Diagnoses       Need for vaccination    -  Primary    Relevant Orders    TDAP 10-64Y (ADACEL,BOOSTRIX) (Completed)    ZOSTER RECOMBINANT ADJUVANTED (SHINGRIX) (Completed)                  Regular exercise  See Patient Instructions      Mitra Diaz is a 59 year old, presenting for the following health issues:  Follow Up (Anxiety; HTN)      7/12/2024     8:37 AM   Additional Questions   Roomed by PAM Ramon   Accompanied by Self         7/12/2024     8:37 AM   Patient Reported Additional Medications   Patient reports taking the following new medications N/A     Denies any chest pain, shortness of breath, dyspnea exertion, palpitations, nausea or vomiting.  Denies any changes in vision or hearing, or urinary or bowel habits.        History of Present Illness       Mental Health Follow-up:  Patient presents to follow-up on Anxiety.    Patient's anxiety since last visit has been:  Good  The patient is not having other symptoms associated with anxiety.  Any significant life events: No  Patient is not feeling anxious or having panic attacks.  Patient has no concerns about alcohol or drug use.    Hypertension: He presents for follow up of hypertension.  He does check blood pressure  regularly outside of the clinic. Outside blood pressures have been over 140/90. He does not follow a  "low salt diet.     He eats 0-1 servings of fruits and vegetables daily.He consumes 1 sweetened beverage(s) daily.He exercises with enough effort to increase his heart rate 10 to 19 minutes per day.  He exercises with enough effort to increase his heart rate 4 days per week.   He is taking medications regularly.         Objective    /83 (BP Location: Left arm, Patient Position: Sitting, Cuff Size: Adult Large)   Pulse 79   Temp 98.6  F (37  C) (Oral)   Resp 12   Ht 1.676 m (5' 6\")   Wt 91.8 kg (202 lb 6.4 oz)   SpO2 95%   BMI 32.67 kg/m    Body mass index is 32.67 kg/m .  Physical Exam  Vitals and nursing note reviewed.   Constitutional:       General: He is not in acute distress.     Appearance: Normal appearance. He is not ill-appearing.   HENT:      Head: Normocephalic and atraumatic.   Eyes:      Extraocular Movements: Extraocular movements intact.      Conjunctiva/sclera: Conjunctivae normal.   Cardiovascular:      Rate and Rhythm: Normal rate and regular rhythm.      Pulses: Normal pulses.      Heart sounds: Normal heart sounds.   Pulmonary:      Effort: Pulmonary effort is normal.      Breath sounds: Normal breath sounds.   Musculoskeletal:      Right lower leg: No edema.      Left lower leg: No edema.   Skin:     Capillary Refill: Capillary refill takes less than 2 seconds.   Neurological:      Mental Status: He is alert and oriented to person, place, and time.   Psychiatric:         Attention and Perception: Attention normal.         Mood and Affect: Mood normal.         Speech: Speech normal.         Thought Content: Thought content normal.      The longitudinal plan of care for the diagnosis(es)/condition(s) as documented were addressed during this visit. Due to the added complexity in care, I will continue to support Joe in the subsequent management and with ongoing continuity of care.        Signed Electronically by: Quentin Shipley,     "

## 2024-07-12 NOTE — ASSESSMENT & PLAN NOTE
Very well-controlled on current dosing of Paxil.  Will continue current dosing.  Did discuss the possibility of reducing but at this point in time patient would like to stay at current dose.

## 2024-07-31 ENCOUNTER — TRANSFERRED RECORDS (OUTPATIENT)
Dept: HEALTH INFORMATION MANAGEMENT | Facility: CLINIC | Age: 59
End: 2024-07-31
Payer: COMMERCIAL

## 2024-08-09 DIAGNOSIS — F41.1 ANXIETY STATE: ICD-10-CM

## 2024-08-09 RX ORDER — PAROXETINE 40 MG/1
40 TABLET, FILM COATED ORAL EVERY MORNING
Qty: 90 TABLET | Refills: 3 | OUTPATIENT
Start: 2024-08-09

## 2024-08-09 ASSESSMENT — SLEEP AND FATIGUE QUESTIONNAIRES
HOW LIKELY ARE YOU TO NOD OFF OR FALL ASLEEP WHEN YOU ARE A PASSENGER IN A CAR FOR AN HOUR WITHOUT A BREAK: WOULD NEVER DOZE
HOW LIKELY ARE YOU TO NOD OFF OR FALL ASLEEP IN A CAR, WHILE STOPPED FOR A FEW MINUTES IN TRAFFIC: WOULD NEVER DOZE
HOW LIKELY ARE YOU TO NOD OFF OR FALL ASLEEP WHILE SITTING INACTIVE IN A PUBLIC PLACE: WOULD NEVER DOZE
HOW LIKELY ARE YOU TO NOD OFF OR FALL ASLEEP WHILE LYING DOWN TO REST IN THE AFTERNOON WHEN CIRCUMSTANCES PERMIT: MODERATE CHANCE OF DOZING
HOW LIKELY ARE YOU TO NOD OFF OR FALL ASLEEP WHILE SITTING AND TALKING TO SOMEONE: WOULD NEVER DOZE
HOW LIKELY ARE YOU TO NOD OFF OR FALL ASLEEP WHILE SITTING QUIETLY AFTER LUNCH WITHOUT ALCOHOL: WOULD NEVER DOZE
HOW LIKELY ARE YOU TO NOD OFF OR FALL ASLEEP WHILE WATCHING TV: SLIGHT CHANCE OF DOZING
HOW LIKELY ARE YOU TO NOD OFF OR FALL ASLEEP WHILE SITTING AND READING: WOULD NEVER DOZE

## 2024-08-13 ENCOUNTER — OFFICE VISIT (OUTPATIENT)
Dept: SLEEP MEDICINE | Facility: CLINIC | Age: 59
End: 2024-08-13
Attending: INTERNAL MEDICINE
Payer: COMMERCIAL

## 2024-08-13 DIAGNOSIS — R06.83 SNORING: ICD-10-CM

## 2024-08-13 DIAGNOSIS — R06.81 WITNESSED APNEIC SPELLS: ICD-10-CM

## 2024-08-13 DIAGNOSIS — R29.818 SUSPECTED SLEEP APNEA: ICD-10-CM

## 2024-08-13 DIAGNOSIS — R53.82 CHRONIC FATIGUE: ICD-10-CM

## 2024-08-13 DIAGNOSIS — F41.1 ANXIETY STATE: ICD-10-CM

## 2024-08-13 RX ORDER — PAROXETINE 40 MG/1
40 TABLET, FILM COATED ORAL EVERY MORNING
Qty: 90 TABLET | Refills: 3 | Status: SHIPPED | OUTPATIENT
Start: 2024-08-13

## 2024-08-13 NOTE — TELEPHONE ENCOUNTER
Hell last script we recd had no refills can we please get this sent over we do not have refills on file

## 2024-08-13 NOTE — PROGRESS NOTES
Pt is completing a home sleep test. Pt was instructed on how to put on the Noxturnal T3 device and associated equipment before going to bed and given the opportunity to practice putting it on before leaving the sleep center. Pt was reminded to bring the home sleep test kit back to the center tomorrow, at agreed upon time for download and reporting.   Neck circumference:

## 2024-08-14 ENCOUNTER — DOCUMENTATION ONLY (OUTPATIENT)
Dept: SLEEP MEDICINE | Facility: CLINIC | Age: 59
End: 2024-08-14
Payer: COMMERCIAL

## 2024-08-20 NOTE — PROGRESS NOTES
Spoke to patient and added him to my schedule for Thursday as a  only.    Beatriz Pacheco , Home Sleep Studies Specialist  [place]  / [facility]

## 2024-08-20 NOTE — PROGRESS NOTES
The HST was returned but did not record properly due to no oximeter.  Patient will need to be called to the repeat test. Staff was notified test is a redo and charges were deleted.

## 2024-08-21 ASSESSMENT — SLEEP AND FATIGUE QUESTIONNAIRES
HOW LIKELY ARE YOU TO NOD OFF OR FALL ASLEEP WHILE SITTING QUIETLY AFTER LUNCH WITHOUT ALCOHOL: WOULD NEVER DOZE
HOW LIKELY ARE YOU TO NOD OFF OR FALL ASLEEP WHILE SITTING AND TALKING TO SOMEONE: WOULD NEVER DOZE
HOW LIKELY ARE YOU TO NOD OFF OR FALL ASLEEP WHEN YOU ARE A PASSENGER IN A CAR FOR AN HOUR WITHOUT A BREAK: WOULD NEVER DOZE
HOW LIKELY ARE YOU TO NOD OFF OR FALL ASLEEP WHILE SITTING AND READING: WOULD NEVER DOZE
HOW LIKELY ARE YOU TO NOD OFF OR FALL ASLEEP WHILE SITTING INACTIVE IN A PUBLIC PLACE: WOULD NEVER DOZE
HOW LIKELY ARE YOU TO NOD OFF OR FALL ASLEEP WHILE LYING DOWN TO REST IN THE AFTERNOON WHEN CIRCUMSTANCES PERMIT: SLIGHT CHANCE OF DOZING
HOW LIKELY ARE YOU TO NOD OFF OR FALL ASLEEP WHILE WATCHING TV: SLIGHT CHANCE OF DOZING
HOW LIKELY ARE YOU TO NOD OFF OR FALL ASLEEP IN A CAR, WHILE STOPPED FOR A FEW MINUTES IN TRAFFIC: WOULD NEVER DOZE

## 2024-08-22 ENCOUNTER — OFFICE VISIT (OUTPATIENT)
Dept: SLEEP MEDICINE | Facility: CLINIC | Age: 59
End: 2024-08-22
Payer: COMMERCIAL

## 2024-08-22 DIAGNOSIS — R06.81 WITNESSED APNEIC SPELLS: ICD-10-CM

## 2024-08-22 DIAGNOSIS — R29.818 SUSPECTED SLEEP APNEA: ICD-10-CM

## 2024-08-22 DIAGNOSIS — R06.83 SNORING: Primary | ICD-10-CM

## 2024-08-22 DIAGNOSIS — R53.82 CHRONIC FATIGUE: ICD-10-CM

## 2024-08-23 ENCOUNTER — DOCUMENTATION ONLY (OUTPATIENT)
Dept: SLEEP MEDICINE | Facility: CLINIC | Age: 59
End: 2024-08-23
Payer: COMMERCIAL

## 2024-08-23 ENCOUNTER — TELEPHONE (OUTPATIENT)
Dept: FAMILY MEDICINE | Facility: CLINIC | Age: 59
End: 2024-08-23

## 2024-08-23 NOTE — TELEPHONE ENCOUNTER
Patient Quality Outreach    Patient is due for the following:   Physical Preventive Adult Physical    Next Steps:   Patient declined follow up at this time.    Type of outreach:    Phone, spoke to patient/parent. Pt, He just had a preop done in June 2024 and declined physical check up at this time. xl      Questions for provider review:    None           Crystal Leone MA

## 2024-08-25 NOTE — PROGRESS NOTES
Pt is completing a home sleep test. Pt was instructed on how to put on the Noxturnal T3 device and associated equipment before going to bed and given the opportunity to practice putting it on before leaving the sleep center. Pt was reminded to bring the home sleep test kit back to the center tomorrow, at the scheduled time for download and reporting. Patient was instructed to complete study using the following treatment?    Neck circumference:  CM /  inches.  Device number:     THIS IS A REDO STUDY.    Beatriz Pacheco , Home Sleep Studies Specialist  [place]  / [facility]

## 2024-08-26 NOTE — PROGRESS NOTES
The HST was returned but did not record properly due to no oximetry all night. Also the band was still intact, pt did not use it.  Patient will need to be called to the repeat test. Staff was notified test is a redo and charges were deleted.

## 2024-08-26 NOTE — PROGRESS NOTES
HST POST-STUDY QUESTIONNAIRE    What time did you go to bed?  12:30  How long do you think it took to fall asleep?  10min  What time did you wake up to start the day?  0730  Did you get up during the night at all?  no  If you woke up, do you remember approximately what time(s)? N/a  Did you have any difficulty with the equipment?  No  Did you us any type of treatment with this study?  None  Was the head of the bed elevated? No  Did you sleep in a recliner?  No  Did you stop using CPAP at least 3 days before this test?  NA  Any other information you'd like us to know? -

## 2024-08-29 ENCOUNTER — VIRTUAL VISIT (OUTPATIENT)
Dept: SLEEP MEDICINE | Facility: CLINIC | Age: 59
End: 2024-08-29
Payer: COMMERCIAL

## 2024-08-29 VITALS — WEIGHT: 200 LBS | BODY MASS INDEX: 31.39 KG/M2 | HEIGHT: 67 IN

## 2024-08-29 DIAGNOSIS — R06.83 SNORING: ICD-10-CM

## 2024-08-29 DIAGNOSIS — R29.818 SUSPECTED SLEEP APNEA: Primary | ICD-10-CM

## 2024-08-29 DIAGNOSIS — R53.82 CHRONIC FATIGUE: ICD-10-CM

## 2024-08-29 DIAGNOSIS — R06.81 WITNESSED APNEIC SPELLS: ICD-10-CM

## 2024-08-29 DIAGNOSIS — I10 ESSENTIAL HYPERTENSION: ICD-10-CM

## 2024-08-29 PROCEDURE — 99203 OFFICE O/P NEW LOW 30 MIN: CPT | Mod: 95 | Performed by: NURSE PRACTITIONER

## 2024-08-29 ASSESSMENT — PAIN SCALES - GENERAL: PAINLEVEL: NO PAIN (0)

## 2024-08-29 NOTE — PROGRESS NOTES
Outpatient Sleep Medicine Consultation:      Name: Joe Pierce MRN# [id number]   Age: 59 year old YOB: 1965     Date of Consultation: August 29, 2024  Consultation is requested by: Quentin Shipley DO  [address] [place] [address] Quentin Shipley  Primary care provider: Quentin Shipley       Reason for Sleep Consult:     Joe Pierce is sent by Quentin Shipley     Patient s Reason for visit  Joe Pierce main reason for visit: Snoring  Patient states problem(s) started: Years ago  Joe Pierce's goals for this visit: Sleep better           Assessment and Plan:     Impression/Plan:    ICD-10-CM    1. Suspected sleep apnea  R29.818 Comprehensive Sleep Study      2. Essential hypertension  I10 Comprehensive Sleep Study      3. Chronic fatigue  R53.82 Comprehensive Sleep Study      Plans for Joe Pierce include a split night PSG, as he has previously undergone a non-diagnostic home sleep study he will be evaluated for central sleep apnea, obstructive sleep apnea, hypoxemia, hypoventilation.  His STOP-BANG score score is 6/8 with positives for snoring, fatigue, blood pressure, age, neck circumference and gender.  He has comorbidities of hypertension, chronic fatigue.    Recommend Joe optimize his sleep schedule as well as his sleep hygiene practices to mitigate any further sleep disruption.  Recommend Joe employ safe driving practices such as not driving the motor vehicle should he become drowsy.  Recommend weight management to BMI of 30.0  Therapy will be initiated and interpretation of the sleep study.      Forty minutes spent with patient, all of which were spent face-to-face counseling, consulting, coordinating plan of care.      SEFERINO Choudhary CNP         History of Present Illness:     Joe Pierce presents to the sleep medicine clinic with concerns of possibly having sleep apnea.  Patient was an E consult and this is a follow-up  visit after patient had a non-diagnostic home sleep test.  We are going to discuss his sleep study results.    Joe Pierce has a medical history notable for hypertension, Crohn's disease, anxiety, nonalcoholic fatty liver disease, anxiety, immunosuppression, and snoring.  He does have seasonal allergies and has had a tonsillectomy in the remote past.    Patient was diagnosed with suspected sleep apnea as he had had witnessed apnea spells, chronic fatigue, and socially disruptive snoring. STOP-BANG Score = 6/8    Patient was on a social outing with a physician friend, who told him that he is concerned about his sleep and told him to have a sleep study completed.    Two antihypertensives: hydrochlorothiazide, Losartan. Last recorded /83    Moderate difficulty with sleep maintenance    Tonsils: Out    Neck Circumference: 18    [place] Sleepiness Scale  Total score - [place]:3   (Less than 10 normal)     Insomnia Severity Scale  MARIA A Total Score: 13  (normal 0-7, mild 8-14, moderate 15-21, severe 22-28)    STOP-BANG score 6/8 with positives for snoring, apneas, blood pressure, age, neck circumference, and gender.    Social History:  Works for [employer]     Past Sleep Evaluations:  None    SLEEP-WAKE SCHEDULE:     Work/School Days: Patient goes to school/work: Yes   Usually gets into bed at 12:00  Takes patient about   to fall asleep  Has trouble falling asleep Once in a while nights per week  Wakes up in the middle of the night Not sure times.  Wakes up due to    He has trouble falling back asleep Once a week times a week.   It usually takes Not long to get back to sleep  Patient is usually up at 8:00  Uses alarm: No    Weekends/Non-work Days/All Other Days:  Usually gets into bed at 1:00 am   Takes patient about 15 min to fall asleep  Patient is usually up at 9 - 10  Uses alarm: No    Sleep Need  Patient gets  6-7 sleep on average   Patient thinks he needs about   sleep     Joe Pierce prefers to sleep  in this position(s): Side   Patient states they do the following activities in bed:      Naps  Patient takes a purposeful nap None these days times a week and naps are usually   in duration  He feels better after a nap: Yes  He dozes off unintentionally Really never, maybe watching TV at night days per week  Patient has had a driving accident or near-miss due to sleepiness/drowsiness: No      SLEEP DISRUPTIONS:    Breathing/Snoring  Patient snores:Yes  Other people complain about his snoring: Yes  Patient has been told he stops breathing in his sleep:Yes  He has issues with the following:  .    Movement:  Patient gets pain, discomfort, with an urge to move:  No restless legs symptoms  It happens when he is resting:  No  It happens more at night:  No  Patient has been told he kicks his legs at night:  No     Behaviors in Sleep:  Joe RICE Latrelllewis has experienced the following behaviors while sleeping: Teeth grinding  He has experienced sudden muscle weakness during the day: No  Pt denies bruxism, sleep talking, sleep walking, and dream enactment behavior. Pt denies sleep paralysis, hypnagogue and cataplexy.       Is there anything else you would like your sleep provider to know:        CAFFEINE AND OTHER SUBSTANCES:    Patient consumes caffeinated beverages per day:  Coffee 3 cups  Last caffeine use is usually: 1:00  List of any prescribed or over the counter stimulants that patient takes:    List of any prescribed or over the counter sleep medication patient takes: unisom  List of previous sleep medications that patient has tried:    Patient drinks alcohol to help them sleep: No  Patient drinks alcohol near bedtime: Yes    Family History:  Patient has a family member been diagnosed with a sleep disorder: No            SCALES:    EPWORTH SLEEPINESS SCALE         8/28/2024    12:56 PM    [place] Sleepiness Scale ( ROLLY Farmer  [id number]<br>ESS - [place]/English - Final version - 21 Nov 07 - [facility] [place].)    Sitting and reading Would never doze   Watching TV Slight chance of dozing   Sitting, inactive in a public place (e.g. a theatre or a meeting) Would never doze   As a passenger in a car for an hour without a break Slight chance of dozing   Lying down to rest in the afternoon when circumstances permit Slight chance of dozing   Sitting and talking to someone Would never doze   Sitting quietly after a lunch without alcohol Would never doze   In a car, while stopped for a few minutes in traffic Would never doze   [place] Score (MC) 3   [place] Score (Sleep) 3         INSOMNIA SEVERITY INDEX (MARIA A)          8/28/2024    12:47 PM   Insomnia Severity Index (MARIA A)   Difficulty falling asleep 0   Difficulty staying asleep 2   Problems waking up too early 2   How SATISFIED/DISSATISFIED are you with your CURRENT sleep pattern? 2   How NOTICEABLE to others do you think your sleep problem is in terms of impairing the quality of your life? 3   How WORRIED/DISTRESSED are you about your current sleep problem? 2   To what extent do you consider your sleep problem to INTERFERE with your daily functioning (e.g. daytime fatigue, mood, ability to function at work/daily chores, concentration, memory, mood, etc.) CURRENTLY? 2   MARIA A Total Score 13       Guidelines for Scoring/Interpretation:  Total score categories:  0-7 = No clinically significant insomnia   8-14 = Subthreshold insomnia   15-21 = Clinical insomnia (moderate severity)  22-28 = Clinical insomnia (severe)  Used via courtesy of www.[facility].va.gov with permission from Feliciano Zarco PhD., [facility]      STOP BANG           8/29/2024    10:35 AM   STOP BANG Questionnaire (  2008, the American Society of Anesthesiologists, Inc. [employer]   BMI Clinic: 31.32         GAD7        7/12/2024     8:17 AM   JENNI-7    1. Feeling nervous, anxious, or on edge 0   2. Not being able to stop or control worrying 0   3. Worrying too much about different  "things 0   4. Trouble relaxing 1   5. Being so restless that it is hard to sit still 0   6. Becoming easily annoyed or irritable 0   7. Feeling afraid, as if something awful might happen 0   JENNI-7 Total Score 1   If you checked any problems, how difficult have they made it for you to do your work, take care of things at home, or get along with other people? Not difficult at all         CAGE-AID         No data to display                CAGE-AID reprinted with permission from the Pending sale to [facility] Journal, LAURIE Ventura. and GLENNY Quintana, \"Conjoint screening questionnaires for alcohol and drug abuse\" [place] Medical Journal 94: 135-140, 1995.      PATIENT HEALTH QUESTIONNAIRE-9 (PHQ - 9)        1/4/2023     4:00 PM   PHQ-9 ([employer])   1.  Little interest or pleasure in doing things 0   2.  Feeling down, depressed, or hopeless 0   3.  Trouble falling or staying asleep, or sleeping too much 0   4.  Feeling tired or having little energy 0   5.  Poor appetite or overeating 0   6.  Feeling bad about yourself - or that you are a failure or have let yourself or your family down 0   7.  Trouble concentrating on things, such as reading the newspaper or watching television 0   8.  Moving or speaking so slowly that other people could have noticed. Or the opposite - being so fidgety or restless that you have been moving around a lot more than usual 0   9.  Thoughts that you would be better off dead, or of hurting yourself in some way 0   PHQ-9 Total Score 0   If you checked off any problems, how difficult have these problems made it for you to do your work, take care of things at home, or get along with other people? Not difficult at all   6.  Feeling bad about yourself 0   7.  Trouble concentrating 0   8.  Moving slowly or restless 0   9.  Suicidal or self-harm thoughts 0   Difficulty at work, home, or with people Not difficult at all       Developed by Werner Barnes, Tenisha Garcia, John Peck and colleagues, with " an educational caden from [employer] No permission required to reproduce, translate, display or distribute.        Allergies:    No Known Allergies    Medications:    Current Outpatient Medications   Medication Sig Dispense Refill    acetic acid-hydrocortisone (VOSOL-HC) 1-2 % otic solution Place 3 drops into the right ear 2 times daily 10 mL 0    calcium carbonate 500 mg, elemental, 1250 (500 Ca) MG tablet chewable       clobetasol (TEMOVATE) 0.05 % external solution APPLY TOPICALLY TO THE AFFECTED AREA AS NEEDED FOR ITCHING      fexofenadine (ALLEGRA) 180 MG tablet [FEXOFENADINE (ALLEGRA) 180 MG TABLET] Take 180 mg by mouth as needed.      fish oil-omega-3 fatty acids 1000 MG capsule Take 2 g by mouth daily      fluocinonide (LIDEX) 0.05 % cream [FLUOCINONIDE (LIDEX) 0.05 % CREAM] Apply sparingly to affected ear 3x weekly for itching 30 g 0    hydroCHLOROthiazide 12.5 MG tablet Take 1 tablet (12.5 mg) by mouth daily 90 tablet 1    hydrocortisone (WESTCORT) 0.2 % cream [HYDROCORTISONE (WESTCORT) 0.2 % CREAM] Apply to affected area bid 45 g 1    inFLIXimab (REMICADE) 100 mg injection [INFLIXIMAB (REMICADE) 100 MG INJECTION] Infuse into a venous catheter. Every 8 weeks      losartan (COZAAR) 100 MG tablet Take 1 tablet (100 mg) by mouth daily 90 tablet 1    Multiple Vitamins-Minerals (MULTIVITAMIN ADULTS 50+) TABS Take 1 tablet by mouth daily      PARoxetine (PAXIL) 40 MG tablet Take 1 tablet (40 mg) by mouth every morning 90 tablet 3    vitamin E (TOCOPHEROL) 100 units (45 mg) capsule take 1 capsule by oral route 2 times every day         Problem List:  Patient Active Problem List    Diagnosis Date Noted    NAFLD (nonalcoholic fatty liver disease) 04/24/2024     Priority: Medium    Infective otitis externa, right 03/13/2024     Priority: Medium    Essential hypertension 03/13/2024     Priority: Medium    Snores 03/13/2024     Priority: Medium    Anxiety      Priority: Medium    Crohn's Disease      Priority: Medium     Immunosuppression (H24)      Priority: Medium    Dermatitis 05/04/2015     Priority: Medium        Past Medical/Surgical History:  Past Medical History:   Diagnosis Date    Anxiety disorder     Crohn's disease (H)     Hypertension     Immunosuppression (H24)     NAFLD (nonalcoholic fatty liver disease)     Seasonal allergies     Snores      Past Surgical History:   Procedure Laterality Date    APPENDECTOMY      CATARACT IOL, RT/LT      LAPAROSCOPIC CHOLECYSTECTOMY N/A 6/20/2024    Procedure: CHOLECYSTECTOMY, ROBOT-ASSISTED, LAPAROSCOPIC, USING DA MARCUS XI;  Surgeon: Niko Penn DO;  Location: [facility] OR    TONSILLECTOMY         Social History:  Social History     Socioeconomic History    Marital status:      Spouse name: Not on file    Number of children: 2    Years of education: 16    Highest education level: Bachelor's degree (e.g., BA, AB, BS)   Occupational History    Occupation:    Tobacco Use    Smoking status: Never     Passive exposure: Never    Smokeless tobacco: Former     Types: Chew   Vaping Use    Vaping status: Never Used   Substance and Sexual Activity    Alcohol use: Yes     Alcohol/week: 9.0 standard drinks of alcohol     Types: 9 Cans of beer per week     Comment: 3/week    Drug use: No    Sexual activity: Yes     Partners: Female     Birth control/protection: Male Surgical     Comment: Vasectomy   Other Topics Concern    Not on file   Social History Narrative    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      Social Determinants of Health     Financial Resource Strain: Not on file   Food Insecurity: Not on file   Transportation Needs: Not on file   Physical Activity: Not on file   Stress: Not on file   Social Connections: Not on file   Interpersonal Safety: Low Risk  (3/13/2024)    Interpersonal Safety     Do you feel physically and emotionally safe where you currently live?: Yes     Within the past 12 months, have you been hit, slapped, kicked or otherwise physically hurt  "by someone?: No     Within the past 12 months, have you been humiliated or emotionally abused in other ways by your partner or ex-partner?: No   Housing Stability: Not on file       Family History:  Family History   Problem Relation Age of Onset    Ovarian Cancer Mother     Breast Cancer Mother     Coronary Artery Disease Father     No Known Problems Maternal Grandmother     No Known Problems Maternal Grandfather     No Known Problems Paternal Grandmother     No Known Problems Paternal Grandfather     No Known Problems Brother     No Known Problems Brother        Review of Systems:  A complete review of systems reviewed by me is negative with the exeption of what has been mentioned in the history of present illness.  In the last TWO WEEKS have you experienced any of the following symptoms?  Fevers: No  Night Sweats: No  Weight Gain: No  Pain at Night: No  Double Vision: No  Changes in Vision: No  Difficulty Breathing through Nose: No  Sore Throat in Morning: No  Dry Mouth in the Morning: No  Shortness of Breath Lying Flat: No  Shortness of Breath With Activity: Yes  Awakening with Shortness of Breath: No  Increased Cough: No  Heart Racing at Night: No  Swelling in Feet or Legs: No  Diarrhea at Night: No  Heartburn at Night: No  Urinating More than Once at Night: No  Losing Control of Urine at Night: No  Joint Pains at Night: No  Headaches in Morning: No  Weakness in Arms or Legs: No  Depressed Mood: No  Anxiety: No     Physical Examination:  Vitals: Ht 1.702 m (5' 7\")   Wt 90.7 kg (200 lb)   BMI 31.32 kg/m    BMI= Body mass index is 31.32 kg/m .         Physical Exam   Constitutional: He appears healthy. No distress.   HENT:   Nose: No nasal discharge.   Mouth/Throat: Oropharynx is clear.   Eyes: Conjunctivae are normal.   Pulmonary/Chest: Effort normal.   Musculoskeletal:      Cervical back: Normal range of motion and neck supple.   Neurological: He is alert and oriented to person, place, and time.    " "      Physical examination is limited by the nature of a virtual visit      All Labs Personally Reviewed               Data: All pertinent previous laboratory data reviewed     Recent Labs   Lab Test 06/18/24  1353 04/24/24  1058    138   POTASSIUM 4.3 4.3   CHLORIDE 102 102   CO2 25 23   ANIONGAP 10 13   GLC 98 90   BUN 10.9 10.3   CR 0.79 0.81   ARMAND 9.5 9.1       Recent Labs   Lab Test 06/18/24  1353 03/13/24  1225   WBC  --  4.6   RBC  --  5.02   HGB 14.2 15.5   HCT  --  45.8   MCV  --  91   MCH  --  30.9   MCHC  --  33.8   RDW  --  12.3   PLT  --  178       Recent Labs   Lab Test 04/24/24  1058   AST 39   ALT 45       TSH (uIU/mL)   Date Value   03/13/2024 2.19       No results found for: \"UAMP\", \"UBARB\", \"BENZODIAZEUR\", \"UCANN\", \"UCOC\", \"OPIT\", \"UPCP\"    No results found for: \"IRONSAT\", \"PC94494\", \"REJI\"    No results found for: \"PH\", \"PHARTERIAL\", \"PO2\", \"DD4YCDJNLRM\", \"SAT\", \"PCO2\", \"HCO3\", \"BASEEXCESS\", \"ERNESTO\", \"BEB\"    @LABRCNTIPR(phv:4,pco2v:4,po2v:4,hco3v:4,leta:4,o2per:4)@    Echocardiology: No results found for this or any previous visit (from the past 4320 hour(s)).        Chest x-ray: No results found for this or any previous visit from the past 365 days.      Chest CT: No results found for this or any previous visit from the past 365 days.      PFT: Most Recent [facility]ze Pulmonary Function Testing        Purvi Smith, SEFERINO CNP 8/29/2024   Sleep Medicine            "

## 2024-08-29 NOTE — PROGRESS NOTES
Virtual Visit Details    Type of service:  Video Visit 11:00 AM-11:50 AM    Originating Location (pt. Location): Home    Distant Location (provider location):  Off-site  Platform used for Video Visit: Nolvia

## 2024-08-29 NOTE — NURSING NOTE
[address]  [facility] [address]    Is the patient currently in the state of [place]? YES    Visit mode:VIDEO    If the visit is dropped, the patient can be reconnected by: VIDEO VISIT: Send to e-mail at: [email or web address]    Will anyone else be joining the visit? NO  (If patient encounters technical issues they should call [phone]    How would you like to obtain your AVS? MyChart    Are changes needed to the allergy or medication list? Pt stated no changes to allergies and Pt stated no med changes    Are refills needed on medications prescribed by this physician? NO    Rooming Documentation:  Questionnaire(s) completed      Reason for visit: Consult    Michelle ROBLEDO

## 2024-09-04 ENCOUNTER — TELEPHONE (OUTPATIENT)
Dept: SLEEP MEDICINE | Facility: CLINIC | Age: 59
End: 2024-09-04

## 2024-09-04 NOTE — PATIENT INSTRUCTIONS
"          MY TREATMENT INFORMATION FOR SLEEP APNEA-  Joe Pierce    DOCTOR : SEFERINO Choudhary CNP    Am I having a sleep study at a sleep center?  --->Due to normal delays, you will be contacted within 2-4 weeks to schedule    Am I having a home sleep study?  --->Watch the video for the device you are using:    -/drop off device-   https://www.[employer].com/watch?v=yGGFBdELGhk    -Disposable device sent out require phone/computer application-   https://www.[employer].com/watch?v=BCce_vbiwxE      Frequently asked questions:  1. What is Obstructive Sleep Apnea (BRIAN)? BRIAN is the most common type of sleep apnea. Apnea means, \"without breath.\"  Apnea is most often caused by narrowing or collapse of the upper airway as muscles relax during sleep.   Almost everyone has occasional apneas. Most people with sleep apnea have had brief interruptions at night frequently for many years.  The severity of sleep apnea is related to how frequent and severe the events are.   2. What are the consequences of BRIAN? Symptoms include: feeling sleepy during the day, snoring loudly, gasping or stopping of breathing, trouble sleeping, and occasionally morning headaches or heartburn at night.  Sleepiness can be serious and even increase the risk of falling asleep while driving. Other health consequences may include development of high blood pressure and other cardiovascular disease in persons who are susceptible. Untreated BRIAN  can contribute to heart disease, stroke and diabetes.   3. What are the treatment options? In most situations, sleep apnea is a lifelong disease that must be managed with daily therapy. Medications are not effective for sleep apnea and surgery is generally not considered until other therapies have been tried. Your treatment is your choice . Continuous Positive Airway (CPAP) works right away and is the therapy that is effective in nearly everyone. An oral device to hold your jaw forward is usually the next " most reliable option. Other options include postioning devices (to keep you off your back), weight loss, and surgery including a tongue pacing device. There is more detail about some of these options below.  4. Are my sleep studies covered by insurance? Although we will request verification of coverage, we advise you also check in advance of the study to ensure there is coverage.    Important tips for those choosing CPAP and similar devices  REMEMBER-IF YOU RECEIVE A CALL FROM  [phone]-->IT IS TO SETUP A DEVICE  For new devices, sign up for device JANA to monitor your device for your followup visits  We encourage you to utilize the [employer] jana or website ( https://[employer]HI).[employer]/ ) to monitor your therapy progress and share the data with your healthcare team when you discuss your sleep apnea.                                                    Know your equipment:  CPAP is continuous positive airway pressure that prevents obstructive sleep apnea by keeping the throat from collapsing while you are sleeping. In most cases, the device is  smart  and can slowly self-adjusts if your throat collapses and keeps a record every day of how well you are treated-this information is available to you and your care team.  BPAP is bilevel positive airway pressure that keeps your throat open and also assists each breath with a pressure boost to maintain adequate breathing.  Special kinds of BPAP are used in patients who have inadequate breathing from lung or heart disease. In most cases, the device is  smart  and can slowly self-adjusts to assist breathing. Like CPAP, the device keeps a record of how well you are treated.  Your mask is your connection to the device. You get to choose what feels most comfortable and the staff will help to make sure if fits. Here: are some examples of the different masks that are available: Magnetic mask aids may assist with use but there are safety issues that should be addressed when  considering with magnets* ( see end of discussion).       Key points to remember on your journey with sleep apnea:  Sleep study.  PAP devices often need to be adjusted during a sleep study to show that they are effective and adjusted right.  Good tips to remember: Try wearing just the mask during a quiet time during the day so your body adapts to wearing it. A humidifier is recommended for comfort in most cases to prevent drying of your nose and throat. Allergy medication from your provider may help you if you are having nasal congestion.  Getting settled-in. It takes more than one night for most of us to get used to wearing a mask. Try wearing just the mask during a quiet time during the day so your body adapts to wearing it. A humidifier is recommended for comfort in most cases. Our team will work with you carefully on the first day and will be in contact within 4 days and again at 2 and 4 weeks for advice and remote device adjustments. Your therapy is evaluated by the device each day.   Use it every night. The more you are able to sleep naturally for 7-8 hours, the more likely you will have good sleep and to prevent health risks or symptoms from sleep apnea. Even if you use it 4 hours it helps. Occasionally all of us are unable to use a medical therapy, in sleep apnea, it is not dangerous to miss one night.   Communicate. Call our skilled team on the number provided on the first day if your visit for problems that make it difficult to wear the device. Over 2 out of 3 patients can learn to wear the device long-term with help from our team. Remember to call our team or your sleep providers if you are unable to wear the device as we may have other solutions for those who cannot adapt to mask CPAP therapy. It is recommended that you sleep your sleep provider within the first 3 months and yearly after that if you are not having problems.   Use it for your health. We encourage use of CPAP masks during daytime quiet  periods to allow your face and brain to adapt to the sensation of CPAP so that it will be a more natural sensation to awaken to at night or during naps. This can be very useful during the first few weeks or months of adapting to CPAP though it does not help medically to wear CPAP during wakefulness and  should not be used as a strategy just to meet guidelines.  Take care of your equipment. Make sure you clean your mask and tubing using directions every day and that your filter and mask are replaced as recommended or if they are not working.     *Masks with magnets:  Updated Contraindications  Masks with magnetic components are contraindicated for use by patients where they, or anyone in close physical contact while using the mask, have the following:   Active medical implants that interact with magnets (i.e., pacemakers, implantable cardioverter defibrillators (ICD), neurostimulators, cerebrospinal fluid (CSF) shunts, insulin/infusion pumps)   Metallic implants/objects containing ferromagnetic material (i.e., aneurysm clips/flow disruption devices, embolic coils, stents, valves, electrodes, implants to restore hearing or balance with implanted magnets, ocular implants, metallic splinters in the eye)  Updated Warning  Keep the mask magnets at a safe distance of at least 6 inches (150 mm) away from implants or medical devices that may be adversely affected by magnetic interference. This warning applies to you or anyone in close physical contact with your mask. The magnets are in the frame and lower headgear clips, with a magnetic field strength of up to 400mT. When worn, they connect to secure the mask but may inadvertently detach while asleep.  Implants/medical devices, including those listed within contraindications, may be adversely affected if they change function under external magnetic fields or contain ferromagnetic materials that attract/repel to magnetic fields (some metallic implants, e.g., contact lenses  with metal, dental implants, metallic cranial plates, screws, mckenna hole covers, and bone substitute devices). Consult your physician and  of your implant / other medical device for information on the potential adverse effects of magnetic fields.    BESIDES CPAP, WHAT OTHER THERAPIES ARE THERE?    Positioning Device  Positioning devices are generally used when sleep apnea is mild and only occurs on your back.This example shows a pillow that straps around the waist. It may be appropriate for those whose sleep study shows milder sleep apnea that occurs primarily when lying flat on one's back. Preliminary studies have shown benefit but effectiveness at home may need to be verified by a home sleep test. These devices are generally not covered by medical insurance.  Examples of devices that maintain sleeping on the back to prevent snoring and mild sleep apnea.    Belt type body positioner  http://[employer]/    Electronic reminder  http://nightshifttherapy.[employer].com/            Oral Appliance  What is oral appliance therapy?  An oral appliance device fits on your teeth at night like a retainer used after having braces. The device is made by a specialized dentist and requires several visits over 1-2 months before a manufactured device is made to fit your teeth and is adjusted to prevent your sleep apnea. Once an oral device is working properly, snoring should be improved. A home sleep test may be recommended at that time if to determine whether the sleep apnea is adequately treated.       Some things to remember:  -Oral devices are often, but not always, covered by your medical insurance. Be sure to check with your insurance provider.   -If you are referred for oral therapy, you will be given a list of specialized dentists to consider or you may choose to visit the Web site of the American Academy of Dental Sleep Medicine  -Oral devices are less likely to work if you have severe sleep apnea or are extremely  overweight.     More detailed information  An oral appliance is a small acrylic device that fits over the upper and lower teeth  (similar to a retainer or a mouth guard). This device slightly moves jaw forward, which moves the base of the tongue forward, opens the airway, improves breathing for effective treat snoring and obstructive sleep apnea in perhaps 7 out of 10 people .  The best working devices are custom-made by a dental device  after a mold is made of the teeth 1, 2, 3.  When is an oral appliance indicated?  Oral appliance therapy is recommended as a first-line treatment for patients with primary snoring, mild sleep apnea, and for patients with moderate sleep apnea who prefer appliance therapy to use of CPAP4, 5. Severity of sleep apnea is determined by sleep testing and is based on the number of respiratory events per hour of sleep.   How successful is oral appliance therapy?  The success rate of oral appliance therapy in patients with mild sleep apnea is 75-80% while in patients with moderate sleep apnea it is 50-70%. The chance of success in patients with severe sleep apnea is 40-50%. The research also shows that oral appliances have a beneficial effect on the cardiovascular health of BRIAN patients at the same magnitude as CPAP therapy7.  Oral appliances should be a second-line treatment in cases of severe sleep apnea, but if not completely successful then a combination therapy utilizing CPAP plus oral appliance therapy may be effective. Oral appliances tend to be effective in a broad range of patients although studies show that the patients who have the highest success are females, younger patients, those with milder disease, and less severe obesity. 3, 6.   Finding a dentist that practices dental sleep medicine  Specific training is available through the American Academy of Dental Sleep Medicine for dentists interested in working in the field of sleep. To find a dentist who is educated in  the field of sleep and the use of oral appliances, near you, visit the Web site of the American Academy of Dental Sleep Medicine.    References  1. Khushbu, et al. Objectively measured vs self-reported compliance during oral appliance therapy for sleep-disordered breathing. Chest 2013; 144(5): 7281-7161.  2. Jyoti et al. Objective measurement of compliance during oral appliance therapy for sleep-disordered breathing. Thorax 2013; 68(1): 91-96.  3. Reese et al. Mandibular advancement devices in 620 men and women with BRIAN and snoring: tolerability and predictors of treatment success. Chest 2004; 125: 4747-4144.  4. Gary, et al. Oral appliances for snoring and BRIAN: a review. Sleep 2006; 29: 244-262.  5. Buzz et al. Oral appliance treatment for BRIAN: an update. J Clin Sleep Med 2014; 10(2): 215-227.  6. Jenn et al. Predictors of OSAH treatment outcome. J Dent Res 2007; 86: 8572-6324.      Weight Loss:   Your Body mass index is 31.32 kg/m .    Being overweight does not necessarily mean you will have health consequences.  Those who have BMI over 35 or over 27 with existing medical conditions carries greater risk.   Weight loss decreases severity of sleep apnea in most people with obesity. For those with mild obesity who have developed snoring with weight gain, even 15-30 pound weight loss can improve and occasionally milder eliminate sleep apnea.  Structured and life-long dietary and health habits are necessary to lose weight and keep healthier weight levels.     The Comprehensive Weight loss program offers all aspects of weight loss strategies including two Non-Surgical Weight Loss Programs: Medical Weight Management and our 24 Week Healthy Lifestyle Program:    Medical Weight Management: You will meet with a Medical Weight Management Provider, as well as a Registered Dietician. The program may include medication therapy, dietary education, recommended exercise and physical therapy programs,  monthly support group meetings, and possible psychological counseling. Follow up visits with the provider or dietician are scheduled based on your progress and needs.    24 Week Healthy Lifestyle Program: This unique program is designed to give you the support of weekly appointments and activities thru a 24-week period. It may include all of the components of the basic program (above), with the addition of 11 individual Health  Visits, 24-week access to the [employer] website for over 700 online classes, and monthly support group meetings. This program has an out-of-pocket expense of $499 to cover the items that can not be billed to insurance (health coaches and [employer] access), and is non-refundable/non-transferable (you may be able to use a Health Savings Account; ask your HSA provider). There may be an optional meal replacement plan prescribed as well.   Surgical management achieves meaningful long-term weight loss and improvement in health risks in most patients with more severe obesity.      Sleep Apnea Surgery:    Surgery for obstructive sleep apnea is considered generally only when other therapies fail to work. Surgery may be discussed with you if you are having a difficult time tolerating CPAP and or when there is an abnormal structure that requires surgical correction.  Nose and throat surgeries often enlarge the airway to prevent collapse.  Most of these surgeries create pain for 1-2 weeks and up to half of the most common surgeries are not effective throughout life.  You should carefully discuss the benefits and drawbacks to surgery with your sleep provider and surgeon to determine if it is the best solution for you.   More information  Surgery for BRIAN is directed at areas that are responsible for narrowing or complete obstruction of the airway during sleep.  There are a wide range of procedures available to enlarge and/or stabilize the airway to prevent blockage of breathing in the three major  areas where it can occur: the palate, tongue, and nasal regions.  Successful surgical treatment depends on the accurate identification of the factors responsible for obstructive sleep apnea in each person.  A personalized approach is required because there is no single treatment that works well for everyone.  Because of anatomic variation, consultation with an examination by a sleep surgeon is a critical first step in determining what surgical options are best for each patient.  In some cases, examination during sedation may be recommended in order to guide the selection of procedures.  Patients will be counseled about risks and benefits as well as the typical recovery course after surgery. Surgery is typically not a cure for a person s BRIAN.  However, surgery will often significantly improve one s BRIAN severity (termed  success rate ).  Even in the absence of a cure, surgery will decrease the cardiovascular risk associated with OSA7; improve overall quality of life8 (sleepiness, functionality, sleep quality, etc).      Palate Procedures:  Patients with BRIAN often have narrowing of their airway in the region of their tonsils and uvula.  The goals of palate procedures are to widen the airway in this region as well as to help the tissues resist collapse.  Modern palate procedure techniques focus on tissue conservation and soft tissue rearrangement, rather than tissue removal.  Often the uvula is preserved in this procedure. Residual sleep apnea is common in patient after pharyngoplasty with an average reduction in sleep apnea events of 33%2.      Tongue Procedures:  ExamWhile patients are awake, the muscles that surround the throat are active and keep this region open for breathing. These muscles relax during sleep, allowing the tongue and other structures to collapse and block breathing.  There are several different tongue procedures available.  Selection of a tongue base procedure depends on characteristics seen on  physical exam.  Generally, procedures are aimed at removing bulky tissues in this area or preventing the back of the tongue from falling back during sleep.  Success rates for tongue surgery range from 50-62%3.    Hypoglossal Nerve Stimulation:  Hypoglossal nerve stimulation has recently received approval from the [place] Food and Drug Administration for the treatment of obstructive sleep apnea.  This is based on research showing that the system was safe and effective in treating sleep apnea6.  Results showed that the median AHI score decreased 68%, from 29.3 to 9.0. This therapy uses an implant system that senses breathing patterns and delivers mild stimulation to airway muscles, which keeps the airway open during sleep.  The system consists of three fully implanted components: a small generator (similar in size to a pacemaker), a breathing sensor, and a stimulation lead.  Using a small handheld remote, a patient turns the therapy on before bed and off upon awakening.    Candidates for this device must be greater than 18 years of age, have moderate to severe obstructive sleep apnea with less than 25% central events  (AHI between 15-65), BMI less than 35, have tried CPAP/oral appliance for at least 8 weeks without success, and have appropriate upper airway anatomy (determined by a sleep endoscopy performed by Dr. Hugo Olivares or Dr. Rusty Cabrales).    Nasal Procedures:  Nasal obstruction can interfere with nasal breathing during the day and night.  Studies have shown that relief of nasal obstruction can improve the ability of some patients to tolerate positive airway pressure therapy for obstructive sleep apnea1.  Treatment options include medications such as nasal saline, topical corticosteroid and antihistamine sprays, and oral medications such as antihistamines or decongestants. Non-surgical treatments can include external nasal dilators for selected patients. If these are not successful by themselves,  surgery can improve the nasal airway either alone or in combination with these other options.        Combination Procedures:  Combination of surgical procedures and other treatments may be recommended, particularly if patients have more than one area of narrowing or persistent positional disease.  The success rate of combination surgery ranges from 66-80%2,3.    References  Henny OROZCO. The Role of the Nose in Snoring and Obstructive Sleep Apnoea: An Update.  Eur Arch Otorhinolaryngol. 2011; 268: 1365-73.   Mateo SM; Josephine JA; Anabel JR; Pallanch JF; Jalen MB; Mariposa SG; Fanny SCHAFFER. Surgical modifications of the upper airway for obstructive sleep apnea in adults: a systematic review and meta-analysis. SLEEP 2010;33(10):1667-4544. Ash MORA. Hypopharyngeal surgery in obstructive sleep apnea: an evidence-based medicine review.  Arch Otolaryngol Head Neck Surg. 2006 Feb;132(2):206-13.  Segundo YH1, [place] Y, Vasu MARIANN. The efficacy of anatomically based multilevel surgery for obstructive sleep apnea. Otolaryngol Head Neck Surg. 2003 Oct;129(4):327-35.  Ash MORA, Goldberg A. Hypopharyngeal Surgery in Obstructive Sleep Apnea: An Evidence-Based Medicine Review. Arch Otolaryngol Head Neck Surg. 2006 Feb;132(2):206-13.  Jyoti THOMAS et al. Upper-Airway Stimulation for Obstructive Sleep Apnea.  N Engl J Med. 2014 Jan 9;370(2):139-49.  Hue Y et al. Increased Incidence of Cardiovascular Disease in Middle-aged Men with Obstructive Sleep Apnea. Am J Respir Crit Care Med; 2002 166: 159-165  Orellana EM et al. Studying Life Effects and Effectiveness of Palatopharyngoplasty (SLEEP) study: Subjective Outcomes of Isolated Uvulopalatopharyngoplasty. Otolaryngol Head Neck Surg. 2011; 144: 623-631.        WHAT IF I ONLY HAVE SNORING?    Mandibular advancement devices, lateral sleep positioning, long-term weight loss and treatment of nasal allergies have been shown to improve snoring.  Exercising tongue muscles with a game  (https://[employer]/us/jana/soundly-reduce-snoring/ny2964797520) or stimulating the tongue during the day with a device (https://doi.org/10.3390/ikn41952526) have improved snoring in some individuals.  https://www.[employer]/  https://www.sleepfoundation.org/best-anti-snoring-mouthpieces-and-mouthguards    Remember to Drive Safe... Drive Alive     Sleep health profoundly affects your health, mood, and your safety.  Thirty three percent of the population (one in three of us) is not getting enough sleep and many have a sleep disorder. Not getting enough sleep or having an untreated / undertreated sleep condition may make us sleepy without even knowing it. In fact, our driving could be dramatically impaired due to our sleep health. As your provider, here are some things I would like you to know about driving:     Here are some warning signs for impairment and dangerous drowsy driving:              -Having been awake more than 16 hours               -Looking tired               -Eyelid drooping              -Head nodding (it could be too late at this point)              -Driving for more than 30 minutes     Some things you could do to make the driving safer if you are experiencing some drowsiness:              -Stop driving and rest              -Call for transportation              -Make sure your sleep disorder is adequately treated     Some things that have been shown NOT to work when experiencing drowsiness while driving:              -Turning on the radio              -Opening windows              -Eating any  distracting  /  entertaining  foods (e.g., sunflower seeds, candy, or any other)              -Talking on the phone      Your decision may not only impact your life, but also the life of others. Please, remember to drive safe for yourself and all of us.

## 2024-09-04 NOTE — TELEPHONE ENCOUNTER
Reason for Call:  Appointment Request    Patient requesting this type of appt:  PSG Split    Requested provider:  [facility]    Reason patient unable to be scheduled:  Epic not populating available appointments    When does patient want to be seen/preferred time:  Next Available    Comments: Pt is needing to schedule a PSG Split. Pt has a regular PSG scheduled on 10/20/24, but is needing that rescheduled to a PSG Split    Could we send this information to you in [facility] or would you prefer to receive a phone call?:   Patient would prefer a phone call   Okay to leave a detailed message?: Yes at Cell number on file:    Telephone Information:   Mobile [phone]       Call taken on 9/4/2024 at 9:38 AM by Sydnee Ballard

## 2024-09-13 ENCOUNTER — TRANSFERRED RECORDS (OUTPATIENT)
Dept: HEALTH INFORMATION MANAGEMENT | Facility: CLINIC | Age: 59
End: 2024-09-13
Payer: COMMERCIAL

## 2024-09-13 LAB — AST SERPL-CCNC: 35 IU/L (ref 0–40)

## 2024-09-25 ENCOUNTER — TRANSFERRED RECORDS (OUTPATIENT)
Dept: HEALTH INFORMATION MANAGEMENT | Facility: CLINIC | Age: 59
End: 2024-09-25
Payer: COMMERCIAL

## 2024-10-16 DIAGNOSIS — I10 ESSENTIAL HYPERTENSION: ICD-10-CM

## 2024-10-17 RX ORDER — LOSARTAN POTASSIUM 100 MG/1
100 TABLET ORAL DAILY
Qty: 90 TABLET | Refills: 1 | Status: SHIPPED | OUTPATIENT
Start: 2024-10-17

## 2024-10-17 RX ORDER — HYDROCHLOROTHIAZIDE 12.5 MG/1
12.5 TABLET ORAL DAILY
Qty: 90 TABLET | Refills: 1 | Status: SHIPPED | OUTPATIENT
Start: 2024-10-17

## 2025-02-04 ENCOUNTER — NURSE TRIAGE (OUTPATIENT)
Dept: FAMILY MEDICINE | Facility: CLINIC | Age: 60
End: 2025-02-04
Payer: COMMERCIAL

## 2025-02-04 DIAGNOSIS — I10 ESSENTIAL HYPERTENSION: Primary | ICD-10-CM

## 2025-02-04 RX ORDER — HYDROCHLOROTHIAZIDE 12.5 MG/1
25 TABLET ORAL DAILY
Qty: 90 TABLET | Refills: 1 | Status: SHIPPED | OUTPATIENT
Start: 2025-02-04

## 2025-02-04 NOTE — TELEPHONE ENCOUNTER
Problem List Items Addressed This Visit       Essential hypertension - Primary     Please have him increase his hydrochlorothiazide to two tabs daily starting tomorrow. And schedule a follow up in clinic in next 5-7 days. Also nurse call to check home bp in 2-3 days. Thank you.         Relevant Medications    hydroCHLOROthiazide 12.5 MG tablet

## 2025-02-04 NOTE — ASSESSMENT & PLAN NOTE
Please have him increase his hydrochlorothiazide to two tabs daily starting tomorrow. And schedule a follow up in clinic in next 5-7 days. Also nurse call to check home bp in 2-3 days. Thank you.

## 2025-02-04 NOTE — TELEPHONE ENCOUNTER
Nurse Triage SBAR    Is this a 2nd Level Triage? YES, LICENSED PRACTITIONER REVIEW IS REQUIRED    Situation:   Patient calling with blood pressure concern    Background:   Patient went to dentist today - had /110  Asymptomatic  Sent home by dentist and advised to go to urgent care or call PCP    Assessment:   Patient typically takes BP medication in the morning  But states he was busy this morning and did not take until 30 min ago when he was sent home from the dentist    BP currently 155/105 while on the phone   Patient denies experiencing symptoms at this time    Protocol Recommended Disposition:   See in Office Within 3 Days    Recommendation:   Advised OV with PCP Clinic  Please call patient back to assist with scheduling   Patient states his previous provider left [facility], would like to schedule with any provider available  Advised if no OV in timeframe, go to   Patient verbalized understanding, agreed with plan of care, and will call back if new or worsening symptoms in the mean time    Routed to team    Does the patient meet one of the following criteria for ADS visit consideration? 16+ years old, with an [facility] PCP     TIP  Providers, please consider if this condition is appropriate for management at one of our Acute and Diagnostic Services sites.     If patient is a good candidate, please use dotphrase <dot>triageresponse and select Refer to ADS to document.  Thanks,  Milagro TORRES RN  Reason for Disposition   Systolic BP >= 160 OR Diastolic >= 100    Additional Information   Negative: Sounds like a life-threatening emergency to the triager   Negative: Symptom is main concern (e.g., headache, chest pain)   Negative: Low blood pressure is main concern   Negative: Systolic BP >= 160 OR Diastolic >= 100, and any cardiac (e.g., breathing difficulty, chest pain) or neurologic symptoms (e.g., new-onset blurred or double vision)   Negative: Pregnant 20 or more weeks (or postpartum < 6 weeks) with  new hand or face swelling   Negative: Pregnant 20 or more weeks (or postpartum < 6 weeks) and Systolic BP >= 160 OR Diastolic >= 110   Negative: Patient sounds very sick or weak to the triager   Negative: Systolic BP >= 200 OR Diastolic >= 120 and having NO cardiac or neurologic symptoms   Negative: Pregnant 20 or more weeks (or postpartum < 6 weeks) with Systolic BP >= 140 OR Diastolic >= 90   Negative: Systolic BP >= 180 OR Diastolic >= 110, and missed most recent dose of blood pressure medication   Negative: Ran out of BP medications   Negative: Taking BP medications and feels is having side effects (e.g., impotence, cough, dizziness)   Negative: Systolic BP >= 180 OR Diastolic >= 110   Negative: Patient wants to be seen    Protocols used: Blood Pressure - High-A-OH

## 2025-02-04 NOTE — TELEPHONE ENCOUNTER
Called patient and relayed below message from Dr. Shipley as written.  Patient verbalized understanding and agreeable with increasing his hydrochlorothiazide as instructed.  Will monitor home BP readings and follow up with nurse call Friday, 2/7/25.  Patient scheduled for appointment with Dr. Shipley on 2/12/25 at 2:00pm.  No further questions/concerns.        Blanca Saez RN  [facility]

## 2025-02-12 ENCOUNTER — OFFICE VISIT (OUTPATIENT)
Dept: FAMILY MEDICINE | Facility: CLINIC | Age: 60
End: 2025-02-12
Payer: COMMERCIAL

## 2025-02-12 VITALS
BODY MASS INDEX: 32.66 KG/M2 | TEMPERATURE: 98.8 F | DIASTOLIC BLOOD PRESSURE: 79 MMHG | OXYGEN SATURATION: 96 % | RESPIRATION RATE: 12 BRPM | HEIGHT: 67 IN | HEART RATE: 105 BPM | WEIGHT: 208.1 LBS | SYSTOLIC BLOOD PRESSURE: 129 MMHG

## 2025-02-12 DIAGNOSIS — I10 ESSENTIAL HYPERTENSION: ICD-10-CM

## 2025-02-12 DIAGNOSIS — K50.90 CROHN'S DISEASE WITHOUT COMPLICATION, UNSPECIFIED GASTROINTESTINAL TRACT LOCATION (H): ICD-10-CM

## 2025-02-12 DIAGNOSIS — Z23 NEED FOR VACCINATION: ICD-10-CM

## 2025-02-12 DIAGNOSIS — Z12.11 SCREEN FOR COLON CANCER: Primary | ICD-10-CM

## 2025-02-12 PROCEDURE — 99213 OFFICE O/P EST LOW 20 MIN: CPT | Mod: 25 | Performed by: FAMILY MEDICINE

## 2025-02-12 PROCEDURE — 90673 RIV3 VACCINE NO PRESERV IM: CPT | Performed by: FAMILY MEDICINE

## 2025-02-12 PROCEDURE — 91320 SARSCV2 VAC 30MCG TRS-SUC IM: CPT | Performed by: FAMILY MEDICINE

## 2025-02-12 PROCEDURE — 90471 IMMUNIZATION ADMIN: CPT | Performed by: FAMILY MEDICINE

## 2025-02-12 PROCEDURE — 90480 ADMN SARSCOV2 VAC 1/ONLY CMP: CPT | Performed by: FAMILY MEDICINE

## 2025-02-12 PROCEDURE — G2211 COMPLEX E/M VISIT ADD ON: HCPCS | Performed by: FAMILY MEDICINE

## 2025-02-12 NOTE — PROGRESS NOTES
"  Assessment & Plan   Assessment & Plan  Screen for colon cancer    Orders:    Colonoscopy Screening  Referral; Future    Need for vaccination    Orders:    INFLUENZA VACCINE TRIVALENT(FLUBLOK)    COVID-19 12+ (PFIZER)    Essential hypertension  Controlled by office numbers but not by home numbers.  Reviewed how to take a proper blood pressure at home.  Also discussed the use of routine exercise as a control mechanism for his blood pressure.  He is now a member over at the [employer] and has been walking at least every other day for the past week.  He is working that into his routine schedule.  Encouraged to continue so and then recheck blood pressure in 6 to 8 weeks.         Crohn's disease without complication, unspecified gastrointestinal tract location (H)  Continue to follow with specialty care                BMI  Estimated body mass index is 32.59 kg/m  as calculated from the following:    Height as of this encounter: 1.702 m (5' 7\").    Weight as of this encounter: 94.4 kg (208 lb 1.6 oz).   Weight management plan: Discussed healthy diet and exercise guidelines    Will call patient and check on blood pressures in 6 to 8 weeks.  If not yet below goal of 140/90 then will bring back to clinic for an appointment      Mitra Diaz is a 59 year old, presenting for the following health issues:  Follow Up (HTN)      2/12/2025     1:46 PM   Additional Questions   Roomed by PAM Ramon   Accompanied by Self         2/12/2025     1:46 PM   Patient Reported Additional Medications   Patient reports taking the following new medications N/A     Patient presents for worries about his home blood pressure.  He has been taking it at home.  But he also admits he has not been resting for 5 minutes prior to taking his blood pressure and his home pressures are averaging above 140 on the systolic and above 90 on the diastolic.  No headaches or changes in vision.  Of note this past week he finally joined an " "athletic center and has been going over at least every other day to go for a walk.  He is actually enjoying his time on the track.  He has noticed even with that little bit of increase in exercise his home averages are slowly coming down and today in office he actually showing a normal blood pressure on losartan 100 mg.  Discussed the options to include elevating his current medications by adding in hydrochlorothiazide or continue the routine walking he has started and reevaluate in 6 to 8 weeks.  Patient would like to try to do it without addition of medications.  Emphasized to the point with the patient that he has to remember that this exercise regimen is the equivalent of taking medication and therefore he cannot skip days even if he might want to.    History of Present Illness       Hypertension: He presents for follow up of hypertension.  He does check blood pressure  regularly outside of the clinic. Outside blood pressures have been over 140/90. He does not follow a low salt diet.     He eats 0-1 servings of fruits and vegetables daily.He consumes 0 sweetened beverage(s) daily.He exercises with enough effort to increase his heart rate 10 to 19 minutes per day.  He exercises with enough effort to increase his heart rate 4 days per week. He is missing 1 dose(s) of medications per week.  He is not taking prescribed medications regularly due to remembering to take.           Objective    /79 (BP Location: Left arm, Patient Position: Sitting, Cuff Size: Adult Large)   Pulse 105   Temp 98.8  F (37.1  C) (Oral)   Resp 12   Ht 1.702 m (5' 7\")   Wt 94.4 kg (208 lb 1.6 oz)   SpO2 96%   BMI 32.59 kg/m    Body mass index is 32.59 kg/m .  Physical Exam  Vitals and nursing note reviewed.   Constitutional:       General: He is not in acute distress.     Appearance: Normal appearance. He is not ill-appearing.   HENT:      Head: Normocephalic and atraumatic.   Eyes:      Extraocular Movements: Extraocular " movements intact.      Conjunctiva/sclera: Conjunctivae normal.   Pulmonary:      Effort: Pulmonary effort is normal.   Neurological:      Mental Status: He is alert and oriented to person, place, and time.   Psychiatric:         Attention and Perception: Attention normal.         Mood and Affect: Mood normal.         Speech: Speech normal.         Thought Content: Thought content normal.              Signed Electronically by: Quentin Shipley,

## 2025-02-12 NOTE — ASSESSMENT & PLAN NOTE
Controlled by office numbers but not by home numbers.  Reviewed how to take a proper blood pressure at home.  Also discussed the use of routine exercise as a control mechanism for his blood pressure.  He is now a member over at the [employer] center and has been walking at least every other day for the past week.  He is working that into his routine schedule.  Encouraged to continue so and then recheck blood pressure in 6 to 8 weeks.

## 2025-02-12 NOTE — ASSESSMENT & PLAN NOTE
Continue to follow with specialty care          [Change in Activity] : change in activity [Fever Above 102] : no fever [Malaise] : no malaise [Sore Throat] : no sore throat [Murmur] : no murmur [Asthma] : no asthma [Joint Pains] : no arthralgias

## 2025-02-17 ENCOUNTER — TRANSFERRED RECORDS (OUTPATIENT)
Dept: HEALTH INFORMATION MANAGEMENT | Facility: CLINIC | Age: 60
End: 2025-02-17
Payer: COMMERCIAL

## 2025-02-24 ENCOUNTER — TELEPHONE (OUTPATIENT)
Dept: SLEEP MEDICINE | Facility: CLINIC | Age: 60
End: 2025-02-24
Payer: COMMERCIAL

## 2025-02-24 NOTE — TELEPHONE ENCOUNTER
Pt was called to reschedule appt forpsg and follow up appts . LVM with phone number to call asking them to call back.      Alem Dominguez    [facility]

## 2025-03-12 ENCOUNTER — TRANSFERRED RECORDS (OUTPATIENT)
Dept: HEALTH INFORMATION MANAGEMENT | Facility: CLINIC | Age: 60
End: 2025-03-12
Payer: COMMERCIAL

## 2025-03-12 LAB
ALT SERPL-CCNC: 40 IU/L (ref 0–44)
AST SERPL-CCNC: 40 IU/L (ref 0–40)

## 2025-03-15 ENCOUNTER — HEALTH MAINTENANCE LETTER (OUTPATIENT)
Age: 60
End: 2025-03-15

## 2025-03-28 ENCOUNTER — TELEPHONE (OUTPATIENT)
Dept: FAMILY MEDICINE | Facility: CLINIC | Age: 60
End: 2025-03-28
Payer: COMMERCIAL

## 2025-03-28 NOTE — TELEPHONE ENCOUNTER
"Left message to call back for:  Joe  Information to relay to patient:  Please see below screenshot and inquire on home BP readings.  If not averaging below 140/90, please assist with scheduling an appointment in clinic.      Blanca Saez RN  [facility]       ===========================================    Received below patient reminder message on 3/26/25 to follow up with patient on their home BP readings:          Per 2/12/25 OV notes:     \"Essential hypertension  Controlled by office numbers but not by home numbers.  Reviewed how to take a proper blood pressure at home.  Also discussed the use of routine exercise as a control mechanism for his blood pressure.  He is now a member over at the [employer] and has been walking at least every other day for the past week.  He is working that into his routine schedule.  Encouraged to continue so and then recheck blood pressure in 6 to 8 weeks.    Will call patient and check on blood pressures in 6 to 8 weeks. If not yet below goal of 140/90 then will bring back to clinic for an appointment\"  "

## 2025-04-01 NOTE — TELEPHONE ENCOUNTER
Joe called.      Blood Pressure update:  3/78=820/89.   3/30/25= 144/92,   3/31/25= 130/93    Joe has a dental appointment tomorrow 4/2/25 and will update the clinic nurse with this blood pressure reading.

## 2025-04-02 PROBLEM — Z76.89 ENCOUNTER TO ESTABLISH CARE: Status: ACTIVE | Noted: 2025-04-02

## 2025-04-02 PROBLEM — K76.0 NAFLD (NONALCOHOLIC FATTY LIVER DISEASE): Status: ACTIVE | Noted: 2024-04-24

## 2025-04-02 PROBLEM — H60.391 INFECTIVE OTITIS EXTERNA, RIGHT: Status: RESOLVED | Noted: 2024-03-13 | Resolved: 2025-04-02

## 2025-04-02 NOTE — TELEPHONE ENCOUNTER
Patient returning call to update BP measurement from dental visit today: 161/111. Patient then took home measurement of 149/92.    Per RN message, appointment scheduled with provider (Jo Ann Ritter 4/4/25).

## 2025-04-07 ENCOUNTER — PATIENT OUTREACH (OUTPATIENT)
Dept: CARE COORDINATION | Facility: CLINIC | Age: 60
End: 2025-04-07
Payer: COMMERCIAL

## 2025-04-14 PROBLEM — Z76.89 ENCOUNTER TO ESTABLISH CARE: Status: RESOLVED | Noted: 2025-04-02 | Resolved: 2025-04-14

## 2025-04-14 NOTE — ASSESSMENT & PLAN NOTE
4/24/2024: Ultrasound demonstrated hepatic steatosis.     Due for CMP.  Encouraged to decrease alcohol intake.  He is also working on modest weight loss.

## 2025-05-07 ENCOUNTER — TRANSFERRED RECORDS (OUTPATIENT)
Dept: ADMINISTRATIVE | Facility: CLINIC | Age: 60
End: 2025-05-07
Payer: COMMERCIAL

## 2025-05-08 NOTE — PROGRESS NOTES
_________________________________________________________________________________________________    Patient name: Joe Piecre  YOB: 1965  Encounter date: 05/07/2025 03:00 PM  Current date: 05/07/2025 04:31 PM  Procedure: Infusion      Infusion/Additional Medication Orders    Assessment: Crohn's disease of large intestine without complication K50.10     Medication grids  Order Date Medication Dose Route Rate Rate 2 Rate 3 Rate 4 Int. of Treat.   03/12/2025 Avsola 10mg/kg  mL/hr    8 Weeks   Inf. Date Medication % Conc. Inf. # Therapy Type Bag # Vials Total mgs Lot # Exp. Date   05/07/2025 Avsola   maintenance  10 932.00 [id number] 08/31/2028   Inf. Date Medication IV Site IV Gauge Start Stop Total Time Wt. (lbs) Wt. (kgs)   05/07/2025 Avsola left hand 22 2:52 PM 3:52 PM 0 hour(s) 0 minute(s) 205.00 92.971     Vitals Flowsheet  Time Temp Pulse Resp Sys BP Kait BP Reaction Conc Rate   2:51 PM 97.2 95 16 147 90      3:22 PM 98.2 89 14 134 85      3:52 PM 98.6 87 14 138 89        Post Infusion  The patient did tolerate the infusion.     Nursing Notes  Order date: 3/12/25  Last infusion date: 3/12/25  Oral premeds per order: N/A  Specialty Pharmacy: N  Change in health status per assessment? N  IV maintenance 0.9% NS 500ml TKO  Start time: 2:50pm  IV start by: Kathryn Bingham RN  IV and Fluid D/C time: 4:07pm  NS volume infused: <50mL   Site condition: CDI and patent throughout infusion, no redness/swelling at IV site  D/C instructions reviewed, Pt verbalized understanding.  Kathryn Bingham RN    Date Medication Total mg Used mg Wasted mg   05/07/2025 Avsola 1000.00 932.00 68.00   03/12/2025 [facility] 1000.00 945.00 55.00   01/15/2025 Avsola 1000.00 951.00 49.00   11/20/2024 Avsola 900.00 900.00 0.00   09/25/2024 Avsola 1000.00 926.00 74.00   07/31/2024 Avsola 900.00 900.00 0.00       Visit oversight provided by:  Eduardo Lancaster MD 05/07/2025 03:00 PM

## 2025-05-14 ENCOUNTER — OFFICE VISIT (OUTPATIENT)
Dept: FAMILY MEDICINE | Facility: CLINIC | Age: 60
End: 2025-05-14
Payer: COMMERCIAL

## 2025-05-14 VITALS
OXYGEN SATURATION: 98 % | RESPIRATION RATE: 14 BRPM | WEIGHT: 205.4 LBS | HEIGHT: 67 IN | TEMPERATURE: 98.1 F | HEART RATE: 69 BPM | SYSTOLIC BLOOD PRESSURE: 146 MMHG | DIASTOLIC BLOOD PRESSURE: 94 MMHG | BODY MASS INDEX: 32.24 KG/M2

## 2025-05-14 DIAGNOSIS — E66.811 CLASS 1 OBESITY DUE TO EXCESS CALORIES WITH SERIOUS COMORBIDITY AND BODY MASS INDEX (BMI) OF 32.0 TO 32.9 IN ADULT: ICD-10-CM

## 2025-05-14 DIAGNOSIS — R06.83 SNORES: ICD-10-CM

## 2025-05-14 DIAGNOSIS — K76.0 NAFLD (NONALCOHOLIC FATTY LIVER DISEASE): ICD-10-CM

## 2025-05-14 DIAGNOSIS — E66.09 CLASS 1 OBESITY DUE TO EXCESS CALORIES WITH SERIOUS COMORBIDITY AND BODY MASS INDEX (BMI) OF 32.0 TO 32.9 IN ADULT: ICD-10-CM

## 2025-05-14 DIAGNOSIS — I10 ESSENTIAL HYPERTENSION: Primary | ICD-10-CM

## 2025-05-14 PROCEDURE — G2211 COMPLEX E/M VISIT ADD ON: HCPCS | Performed by: PHYSICIAN ASSISTANT

## 2025-05-14 PROCEDURE — 3080F DIAST BP >= 90 MM HG: CPT | Performed by: PHYSICIAN ASSISTANT

## 2025-05-14 PROCEDURE — 3077F SYST BP >= 140 MM HG: CPT | Performed by: PHYSICIAN ASSISTANT

## 2025-05-14 PROCEDURE — 99214 OFFICE O/P EST MOD 30 MIN: CPT | Performed by: PHYSICIAN ASSISTANT

## 2025-05-14 PROCEDURE — 36415 COLL VENOUS BLD VENIPUNCTURE: CPT | Performed by: PHYSICIAN ASSISTANT

## 2025-05-14 PROCEDURE — 80053 COMPREHEN METABOLIC PANEL: CPT | Performed by: PHYSICIAN ASSISTANT

## 2025-05-14 RX ORDER — AMLODIPINE BESYLATE 10 MG/1
10 TABLET ORAL DAILY
Qty: 90 TABLET | Refills: 1 | Status: SHIPPED | OUTPATIENT
Start: 2025-05-14

## 2025-05-14 ASSESSMENT — PATIENT HEALTH QUESTIONNAIRE - PHQ9
SUM OF ALL RESPONSES TO PHQ QUESTIONS 1-9: 0
10. IF YOU CHECKED OFF ANY PROBLEMS, HOW DIFFICULT HAVE THESE PROBLEMS MADE IT FOR YOU TO DO YOUR WORK, TAKE CARE OF THINGS AT HOME, OR GET ALONG WITH OTHER PEOPLE: NOT DIFFICULT AT ALL
SUM OF ALL RESPONSES TO PHQ QUESTIONS 1-9: 0

## 2025-05-14 ASSESSMENT — ANXIETY QUESTIONNAIRES
6. BECOMING EASILY ANNOYED OR IRRITABLE: NOT AT ALL
2. NOT BEING ABLE TO STOP OR CONTROL WORRYING: NOT AT ALL
GAD7 TOTAL SCORE: 0
GAD7 TOTAL SCORE: 0
8. IF YOU CHECKED OFF ANY PROBLEMS, HOW DIFFICULT HAVE THESE MADE IT FOR YOU TO DO YOUR WORK, TAKE CARE OF THINGS AT HOME, OR GET ALONG WITH OTHER PEOPLE?: NOT DIFFICULT AT ALL
5. BEING SO RESTLESS THAT IT IS HARD TO SIT STILL: NOT AT ALL
1. FEELING NERVOUS, ANXIOUS, OR ON EDGE: NOT AT ALL
3. WORRYING TOO MUCH ABOUT DIFFERENT THINGS: NOT AT ALL
7. FEELING AFRAID AS IF SOMETHING AWFUL MIGHT HAPPEN: NOT AT ALL
GAD7 TOTAL SCORE: 0
4. TROUBLE RELAXING: NOT AT ALL
IF YOU CHECKED OFF ANY PROBLEMS ON THIS QUESTIONNAIRE, HOW DIFFICULT HAVE THESE PROBLEMS MADE IT FOR YOU TO DO YOUR WORK, TAKE CARE OF THINGS AT HOME, OR GET ALONG WITH OTHER PEOPLE: NOT DIFFICULT AT ALL
7. FEELING AFRAID AS IF SOMETHING AWFUL MIGHT HAPPEN: NOT AT ALL

## 2025-05-14 NOTE — ASSESSMENT & PLAN NOTE
A referral was previously placed to sleep medicine.  He has not set this up.  This was encouraged today.

## 2025-05-14 NOTE — PROGRESS NOTES
The longitudinal plan of care for the diagnosis(es)/condition(s) as documented were addressed during this visit. Due to the added complexity in care, I will continue to support Joe in the subsequent management and with ongoing continuity of care.    Assessment & Plan   Problem List Items Addressed This Visit       Essential hypertension - Primary    Blood pressures currently managed with losartan and hydrochlorothiazide and amlodipine.  BP is elevated today.  Will increase amlodipine to 10 mg daily.  Due for CMP for medication monitoring.  He will send an updated portal message with updated blood pressures when he returns from [place] in early June.      Last Comprehensive Metabolic Panel:  Lab Results   Component Value Date     06/18/2024    POTASSIUM 4.3 06/18/2024    CHLORIDE 102 06/18/2024    CO2 25 06/18/2024    ANIONGAP 10 06/18/2024    GLC 98 06/18/2024    BUN 10.9 06/18/2024    CR 0.79 06/18/2024    GFRESTIMATED >90 06/18/2024    ARMAND 9.5 06/18/2024            Relevant Medications    amLODIPine (NORVASC) 10 MG tablet    Other Relevant Orders    Comprehensive metabolic panel (BMP + Alb, Alk Phos, ALT, AST, Total. Bili, TP)    Snores    A referral was previously placed to sleep medicine.  He has not set this up.  This was encouraged today.         NAFLD (nonalcoholic fatty liver disease)    4/24/2024: Ultrasound demonstrated hepatic steatosis.     Due for CMP.  Encouraged to decrease alcohol intake.  He is also working on modest weight loss.         Class 1 obesity due to excess calories with serious comorbidity and body mass index (BMI) of 32.0 to 32.9 in adult    BMI 32.  He is active with playing golf.  He discussed that he has struggled more recently with losing weight.  We discussed moderate weight loss will help improve his blood pressure as well.           Subjective   Joe is a 59 year old, presenting for the following health issues:  Hypertension and Depression        5/14/2025    11:10 AM  "  Additional Questions   Roomed by ac   Accompanied by self     History of Present Illness       Hypertension: He presents for follow up of hypertension.  He does not check blood pressure  regularly outside of the clinic. Outside blood pressures have been over 140/90. He does not follow a low salt diet.     He eats 0-1 servings of fruits and vegetables daily.He consumes 0 sweetened beverage(s) daily.He exercises with enough effort to increase his heart rate 10 to 19 minutes per day.  He exercises with enough effort to increase his heart rate 3 or less days per week.   He is taking medications regularly.            Objective    BP (!) 146/94 (BP Location: Left arm, Patient Position: Sitting, Cuff Size: Adult Regular)   Pulse 69   Temp 98.1  F (36.7  C) (Oral)   Resp 14   Ht 1.702 m (5' 7\")   Wt 93.2 kg (205 lb 6.4 oz)   SpO2 98%   BMI 32.17 kg/m    Body mass index is 32.17 kg/m .  Physical Exam  Vitals and nursing note reviewed.   Constitutional:       Appearance: Normal appearance. He is obese.   Cardiovascular:      Rate and Rhythm: Normal rate and regular rhythm.      Heart sounds: Normal heart sounds.   Pulmonary:      Effort: Pulmonary effort is normal.      Breath sounds: Normal breath sounds.   Neurological:      Mental Status: He is alert.        Signed Electronically by: Jo Ann Ritter PA-C    "

## 2025-05-14 NOTE — ASSESSMENT & PLAN NOTE
Blood pressures currently managed with losartan and hydrochlorothiazide and amlodipine.  BP is elevated today.  Will increase amlodipine to 10 mg daily.  Due for [facility] for medication monitoring.  He will send an updated portal message with updated blood pressures when he returns from [place] in early June.      Last Comprehensive Metabolic Panel:  Lab Results   Component Value Date     06/18/2024    POTASSIUM 4.3 06/18/2024    CHLORIDE 102 06/18/2024    CO2 25 06/18/2024    ANIONGAP 10 06/18/2024    GLC 98 06/18/2024    BUN 10.9 06/18/2024    CR 0.79 06/18/2024    GFRESTIMATED >90 06/18/2024    ARMAND 9.5 06/18/2024

## 2025-05-15 LAB
ALBUMIN SERPL BCG-MCNC: 4.1 G/DL (ref 3.5–5.2)
ALP SERPL-CCNC: 43 U/L (ref 40–150)
ALT SERPL W P-5'-P-CCNC: 30 U/L (ref 0–70)
ANION GAP SERPL CALCULATED.3IONS-SCNC: 12 MMOL/L (ref 7–15)
AST SERPL W P-5'-P-CCNC: 31 U/L (ref 0–45)
BILIRUB SERPL-MCNC: 0.4 MG/DL
BUN SERPL-MCNC: 13.6 MG/DL (ref 6–20)
CALCIUM SERPL-MCNC: 9.5 MG/DL (ref 8.8–10.4)
CHLORIDE SERPL-SCNC: 102 MMOL/L (ref 98–107)
CREAT SERPL-MCNC: 0.79 MG/DL (ref 0.67–1.17)
EGFRCR SERPLBLD CKD-EPI 2021: >90 ML/MIN/1.73M2
GLUCOSE SERPL-MCNC: 91 MG/DL (ref 70–99)
HCO3 SERPL-SCNC: 23 MMOL/L (ref 22–29)
POTASSIUM SERPL-SCNC: 4.2 MMOL/L (ref 3.4–5.3)
PROT SERPL-MCNC: 8.4 G/DL (ref 6.4–8.3)
SODIUM SERPL-SCNC: 137 MMOL/L (ref 135–145)

## 2025-06-20 ENCOUNTER — TELEPHONE (OUTPATIENT)
Dept: FAMILY MEDICINE | Facility: CLINIC | Age: 60
End: 2025-06-20

## 2025-06-20 ENCOUNTER — MYC MEDICAL ADVICE (OUTPATIENT)
Dept: FAMILY MEDICINE | Facility: CLINIC | Age: 60
End: 2025-06-20
Payer: COMMERCIAL

## 2025-06-23 NOTE — TELEPHONE ENCOUNTER
5/14/25 OV indicates patient can send updated BP via [employer]. Please advise if provider or RN visit is needed or if patient can update via [employer].

## 2025-06-26 ENCOUNTER — ALLIED HEALTH/NURSE VISIT (OUTPATIENT)
Dept: FAMILY MEDICINE | Facility: CLINIC | Age: 60
End: 2025-06-26
Payer: COMMERCIAL

## 2025-06-26 VITALS — DIASTOLIC BLOOD PRESSURE: 85 MMHG | SYSTOLIC BLOOD PRESSURE: 125 MMHG

## 2025-06-26 DIAGNOSIS — I10 ESSENTIAL HYPERTENSION: Primary | ICD-10-CM

## 2025-06-26 NOTE — PROGRESS NOTES
5/14/25 OV note:      Essential hypertension - Primary      Blood pressures currently managed with losartan and hydrochlorothiazide and amlodipine.  BP is elevated today.  Will increase amlodipine to 10 mg daily.  Due for [facility] for medication monitoring.  He will send an updated portal message with updated blood pressures when he returns from [place] in early June.          I met with Joe Pierce at the request of NIXON Rea to recheck his blood pressure.  Blood pressure medications on the med list were reviewed with patient.    Patient has taken all medications as per usual regimen: Yes  Patient reports tolerating them without any issues or concerns: No    Vitals:    06/26/25 1040   BP: 125/85   BP Location: Left arm   Patient Position: Sitting   Cuff Size: Adult Large       Blood pressure was taken, previous encounter was reviewed, recorded blood pressure below 140/90.  Patient was discharged and the note will be sent to the provider for final review.

## 2025-07-02 ENCOUNTER — TRANSFERRED RECORDS (OUTPATIENT)
Dept: ADMINISTRATIVE | Facility: CLINIC | Age: 60
End: 2025-07-02
Payer: COMMERCIAL

## 2025-07-03 NOTE — PROGRESS NOTES
_________________________________________________________________________________________________    Patient name: Joe Pierce  YOB: 1965  Encounter date: 07/02/2025 03:00 PM  Current date: 07/02/2025 04:05 PM  Procedure: Infusion      Infusion/Additional Medication Orders    Assessment: Crohn's disease of large intestine without complication K50.10     Medication grids  Order Date Medication Dose Route Rate Rate 2 Rate 3 Rate 4 Int. of Treat.   03/12/2025 Avsola 10mg/kg  mL/hr    8 Weeks   Inf. Date Medication % Conc. Inf. # Therapy Type Bag # Vials Total mgs Lot # Exp. Date   07/02/2025 [facility]sola   maintenance  10 950.00 [id number] 10/31/2028   Inf. Date Medication IV Site IV Gauge Start Stop Total Time Wt. (lbs) Wt. (kgs)   07/02/2025 [facility]sola left hand 22 2:56 PM 3:56 PM 0 hour(s) 0 minute(s) 209.00 94.785     Vitals Flowsheet  Time Temp Pulse Resp Sys BP Kait BP Reaction Conc Rate   2:50 PM 97.9 89 14 149 86      3:24 PM 97.9 92 14 152 82      3:56 PM 98.3 94 14 153 87        Post Infusion  The patient did tolerate the infusion.     Nursing Notes  Order date: 3/12/2025  Last infusion date: 5/07/2025  Oral premeds per order: N  Specialty Pharmacy: N  Change in health status per assessment? N  IV maintenance 0.9% NS 500ml TKO  Start time: 2:50 pm  IV start by: Penny Acuna RN  IV and Fluid D/C time: 4:11 pm  NS volume infused: <50mL  Site condition: CDI and patent throughout infusion, no redness/swelling at IV site  D/C instructions reviewed, Pt verbalized understanding.  Penny Acuna RN    Date Medication Total mg Used mg Wasted mg   07/02/2025 [facility]sola 1000.00 950.00 50.00   05/07/2025 [facility] 1000.00 932.00 68.00   03/12/2025 [facility] 1000.00 945.00 55.00   01/15/2025 [facility] 1000.00 951.00 49.00   11/20/2024 [facility]ola 900.00 900.00 0.00   09/25/2024 [facility]ola 1000.00 926.00 74.00       Visit oversight provided by:  John Epps MD 07/02/2025 03:00 PM

## 2025-07-15 DIAGNOSIS — I10 ESSENTIAL HYPERTENSION: ICD-10-CM

## 2025-07-15 RX ORDER — LOSARTAN POTASSIUM AND HYDROCHLOROTHIAZIDE 25; 100 MG/1; MG/1
1 TABLET ORAL DAILY
Qty: 90 TABLET | Refills: 2 | Status: SHIPPED | OUTPATIENT
Start: 2025-07-15

## 2025-08-09 ENCOUNTER — HEALTH MAINTENANCE LETTER (OUTPATIENT)
Age: 60
End: 2025-08-09

## 2025-08-11 PROBLEM — Z00.00 PREVENTATIVE HEALTH CARE: Status: ACTIVE | Noted: 2025-08-11

## 2025-08-27 ENCOUNTER — TRANSFERRED RECORDS (OUTPATIENT)
Dept: ADMINISTRATIVE | Facility: CLINIC | Age: 60
End: 2025-08-27
Payer: COMMERCIAL

## 2025-08-27 ENCOUNTER — TRANSFERRED RECORDS (OUTPATIENT)
Dept: MULTI SPECIALTY CLINIC | Facility: CLINIC | Age: 60
End: 2025-08-27
Payer: COMMERCIAL

## 2025-08-27 LAB
ALT SERPL-CCNC: 46 IU/L (ref 0–44)
AST SERPL-CCNC: 45 IU/L (ref 0–40)

## 2025-08-28 ENCOUNTER — TRANSFERRED RECORDS (OUTPATIENT)
Dept: ADMINISTRATIVE | Facility: CLINIC | Age: 60
End: 2025-08-28
Payer: COMMERCIAL

## (undated) DEVICE — DAVINCI XI DRAPE COLUMN 470341

## (undated) DEVICE — DRAPE U SPLIT 74X120" 29440

## (undated) DEVICE — NDL INSUFFLATION 13GA 120MM C2201

## (undated) DEVICE — SYR 50ML SLIP TIP W/O NDL 309654

## (undated) DEVICE — DRAPE SHEET TABLE COVER KC 42301*

## (undated) DEVICE — GLOVE SURGEON PI ORTHO SZ 6-1/2 LF

## (undated) DEVICE — DAVINCI XI DRAPE ARM 470015

## (undated) DEVICE — PREP CHLORAPREP 26ML TINTED HI-LITE ORANGE 930815

## (undated) DEVICE — GLOVE UNDER INDICATOR PI SZ 7.0 LF 41670

## (undated) DEVICE — BLADE KNIFE SURG 11 371111

## (undated) DEVICE — SOL WATER IRRIG 1000ML BOTTLE 2F7114

## (undated) DEVICE — DAVINCI XI SEAL UNIVERSAL 5-12MM 470500

## (undated) DEVICE — DRAPE SHEET REV FOLD 3/4 9349

## (undated) DEVICE — CLIP ENDO HEMO-LOC PURPLE LG 544240

## (undated) DEVICE — GOWN IMPERVIOUS BREATHABLE SMART XLG 89045

## (undated) DEVICE — LUBRICANT INST ELECTROLUBE EL101

## (undated) DEVICE — ENDO POUCH UNIVERSAL RETRIEVAL SYSTEM INZII 5MM CD003

## (undated) DEVICE — NEEDLE HYPO 18X1-1/2 SAFETY 305918

## (undated) DEVICE — TUBING SMOKE EVAC PNEUMOCLEAR HIGH FLOW 0620050250

## (undated) DEVICE — SU VICRYL+ 3-0 27IN SH UND VCP416H

## (undated) DEVICE — CUSTOM PACK LAP CHOLE SBA5BLCHEA

## (undated) DEVICE — DAVINCI XI OBTURATOR BLADELESS 8MM 470359

## (undated) DEVICE — SUTURE VICRYL+ 0 27IN CT-1 UND VCP260H

## (undated) DEVICE — SU DERMABOND ADVANCED .7ML DNX12

## (undated) RX ORDER — PROPOFOL 10 MG/ML
INJECTION, EMULSION INTRAVENOUS
Status: DISPENSED
Start: 2024-06-20

## (undated) RX ORDER — LIDOCAINE HYDROCHLORIDE AND EPINEPHRINE 10; 10 MG/ML; UG/ML
INJECTION, SOLUTION INFILTRATION; PERINEURAL
Status: DISPENSED
Start: 2024-06-20

## (undated) RX ORDER — FENTANYL CITRATE 50 UG/ML
INJECTION, SOLUTION INTRAMUSCULAR; INTRAVENOUS
Status: DISPENSED
Start: 2024-06-20

## (undated) RX ORDER — EPHEDRINE SULFATE 50 MG/ML
INJECTION, SOLUTION INTRAMUSCULAR; INTRAVENOUS; SUBCUTANEOUS
Status: DISPENSED
Start: 2024-06-20